# Patient Record
Sex: FEMALE | Race: WHITE | NOT HISPANIC OR LATINO | Employment: OTHER | URBAN - NONMETROPOLITAN AREA
[De-identification: names, ages, dates, MRNs, and addresses within clinical notes are randomized per-mention and may not be internally consistent; named-entity substitution may affect disease eponyms.]

---

## 2017-03-08 DIAGNOSIS — I47.10 SVT (SUPRAVENTRICULAR TACHYCARDIA): ICD-10-CM

## 2017-03-08 RX ORDER — FLECAINIDE ACETATE 50 MG/1
50 TABLET ORAL 2 TIMES DAILY
Qty: 180 TAB | Refills: 1 | Status: CANCELLED | OUTPATIENT
Start: 2017-03-08

## 2017-03-08 RX ORDER — FLECAINIDE ACETATE 50 MG/1
50 TABLET ORAL 2 TIMES DAILY
Qty: 180 TAB | Refills: 3 | Status: SHIPPED | OUTPATIENT
Start: 2017-03-08 | End: 2018-04-17 | Stop reason: SDUPTHER

## 2017-06-16 ENCOUNTER — OFFICE VISIT (OUTPATIENT)
Dept: CARDIOLOGY | Facility: CLINIC | Age: 72
End: 2017-06-16
Payer: MEDICARE

## 2017-06-16 VITALS
DIASTOLIC BLOOD PRESSURE: 80 MMHG | WEIGHT: 165 LBS | HEART RATE: 70 BPM | BODY MASS INDEX: 25.9 KG/M2 | SYSTOLIC BLOOD PRESSURE: 120 MMHG | HEIGHT: 67 IN

## 2017-06-16 DIAGNOSIS — I34.1 MITRAL VALVE PROLAPSE: ICD-10-CM

## 2017-06-16 DIAGNOSIS — I47.10 SVT (SUPRAVENTRICULAR TACHYCARDIA): ICD-10-CM

## 2017-06-16 DIAGNOSIS — E78.00 PURE HYPERCHOLESTEROLEMIA: ICD-10-CM

## 2017-06-16 DIAGNOSIS — I10 ESSENTIAL HYPERTENSION, BENIGN: ICD-10-CM

## 2017-06-16 PROCEDURE — 99213 OFFICE O/P EST LOW 20 MIN: CPT | Performed by: INTERNAL MEDICINE

## 2017-06-16 ASSESSMENT — ENCOUNTER SYMPTOMS
ORTHOPNEA: 0
COUGH: 0
MYALGIAS: 0
PND: 0
WHEEZING: 0

## 2017-06-16 NOTE — Clinical Note
[facility]   [address] [place] [address]  Phone: [phone]  Fax: [phone]              Saba Stallworth  1945    Encounter Date: 6/16/2017    Liborio Davis M.D.          PROGRESS NOTE:  Subjective:   Saba Stallworth is a 72 y.o. female who presents today for follow-up of her arrhythmia and her blood pressure.  She's had occasional palpitation and also transient positional vertigo which has not recurred.  She notes edema at the end of a long trip.      Past Medical History   Diagnosis Date   • Hypothyroidism    • Hyperlipidemia    • SVT (supraventricular tachycardia) 3/28/2014     Successfully ablated, 2002    • Arthritis      right thumb and wrist   • Hypertension    • Cold 2/7/15     sinus infection   • Staph infection 1/2015     right axilla Tx with antibiotics     Past Surgical History   Procedure Laterality Date   • Hysterectomy laparoscopy       Dr. Johnson   • Tonsillectomy     • Other cardiac surgery  2002     cardiac ablation   • Mass excision general  12/12/2014     Performed by Tara Hassan M.D. at SURGERY [facility]   • Recovery  2/19/2015     Performed by Cath-Recovery Surgery at SURGERY SAME DAY [facility]     Family History   Problem Relation Age of Onset   • Cancer Mother      leiomyosarcoma   • Heart Disease Father    • Heart Failure Father    • Heart Attack Father    • Heart Disease Maternal Uncle    • Heart Attack Maternal Uncle    • Heart Attack Paternal Grandmother    • Other Maternal Grandmother      scoliosis     History   Smoking status   • Never Smoker    Smokeless tobacco   • Never Used     No Known Allergies  Outpatient Encounter Prescriptions as of 6/16/2017   Medication Sig Dispense Refill   • flecainide (TAMBOCOR) 50 MG tablet Take 1 Tab by mouth 2 times a day. 180 Tab 3   • potassium chloride SA (K-DUR) 20 MEQ Tab CR Take 1 Tab by mouth every day. 90 Tab 1   • furosemide (LASIX) 20 MG  "Tab Take 20 mg by mouth every day.     • denosumab (PROLIA) 60 MG/ML SOLN Inject 60 mg as instructed Once. Every 6 months     • Multiple Vitamin (MULTI-VITAMIN DAILY PO) Take  by mouth every day.     • Cholecalciferol (VITAMIN D PO) Take  by mouth every day.     • levothyroxine (SYNTHROID) 75 MCG TABS Take 75 mcg by mouth every day.     • simvastatin (ZOCOR) 10 MG TABS Take 10 mg by mouth every evening.     • venlafaxine (EFFEXOR) 75 MG TABS Take 75 mg by mouth every day.       No facility-administered encounter medications on file as of 6/16/2017.     Review of Systems   Respiratory: Negative for cough and wheezing.    Cardiovascular: Negative for orthopnea and PND.   Musculoskeletal: Negative for myalgias.        Objective:   /80 mmHg  Pulse 70  Ht 1.702 m (5' 7\")  Wt 74.844 kg (165 lb)  BMI 25.84 kg/m2    Physical Exam   Constitutional: She is oriented to person, place, and time. She appears well-developed and well-nourished.   Eyes: Conjunctivae are normal. No scleral icterus.   Neck: No JVD present.   Cardiovascular: Normal rate, regular rhythm, normal heart sounds and intact distal pulses.  Exam reveals no gallop.    No murmur heard.  Pulmonary/Chest: Effort normal and breath sounds normal.   Musculoskeletal: She exhibits no edema.   Neurological: She is alert and oriented to person, place, and time.   Skin: Skin is warm and dry.   Psychiatric: She has a normal mood and affect. Thought content normal.       Assessment:     1. Essential hypertension, benign     2. SVT (supraventricular tachycardia)     3. Mitral valve prolapse     4. Pure hypercholesterolemia       Her lab are very satisfactory including good control of her lipids. We did review them because the automatic nomogram gives her a low GFR which of course she does not have based on her actual appearance and ideal body weight and creatinine. By Cockroft-Gault her GFR is 60.  Medical Decision Making:  Today's Assessment / Status / Plan:     "     Continue the current cardiovascular regimen.  Continue primary follow up with  Dr. Schofield.   Cardiology follow up in  6 months and  sooner if needed for any change.  EP eval if breakthough of arrhythmia.  Lab before next visit.  Use of the emergency medical system reviewed.       No Recipients

## 2017-06-16 NOTE — PROGRESS NOTES
Subjective:   Saba Stallworth is a 72 y.o. female who presents today for follow-up of her arrhythmia and her blood pressure.  She's had occasional palpitation and also transient positional vertigo which has not recurred.  She notes edema at the end of a long trip.      Past Medical History   Diagnosis Date   • Hypothyroidism    • Hyperlipidemia    • SVT (supraventricular tachycardia) 3/28/2014     Successfully ablated, 2002    • Arthritis      right thumb and wrist   • Hypertension    • Cold 2/7/15     sinus infection   • Staph infection 1/2015     right axilla Tx with antibiotics     Past Surgical History   Procedure Laterality Date   • Hysterectomy laparoscopy       Dr. Johnson   • Tonsillectomy     • Other cardiac surgery  2002     cardiac ablation   • Mass excision general  12/12/2014     Performed by Tara Hassan M.D. at SURGERY [facility]   • Recovery  2/19/2015     Performed by Cath-Recovery Surgery at SURGERY SAME DAY [facility]     Family History   Problem Relation Age of Onset   • Cancer Mother      leiomyosarcoma   • Heart Disease Father    • Heart Failure Father    • Heart Attack Father    • Heart Disease Maternal Uncle    • Heart Attack Maternal Uncle    • Heart Attack Paternal Grandmother    • Other Maternal Grandmother      scoliosis     History   Smoking status   • Never Smoker    Smokeless tobacco   • Never Used     No Known Allergies  Outpatient Encounter Prescriptions as of 6/16/2017   Medication Sig Dispense Refill   • flecainide (TAMBOCOR) 50 MG tablet Take 1 Tab by mouth 2 times a day. 180 Tab 3   • potassium chloride SA (K-DUR) 20 MEQ Tab CR Take 1 Tab by mouth every day. 90 Tab 1   • furosemide (LASIX) 20 MG Tab Take 20 mg by mouth every day.     • denosumab (PROLIA) 60 MG/ML SOLN Inject 60 mg as instructed Once. Every 6 months     • Multiple Vitamin (MULTI-VITAMIN DAILY PO) Take  by mouth every day.     • Cholecalciferol (VITAMIN D PO) Take  by mouth every day.     •  "levothyroxine (SYNTHROID) 75 MCG TABS Take 75 mcg by mouth every day.     • simvastatin (ZOCOR) 10 MG TABS Take 10 mg by mouth every evening.     • venlafaxine (EFFEXOR) 75 MG TABS Take 75 mg by mouth every day.       No facility-administered encounter medications on file as of 6/16/2017.     Review of Systems   Respiratory: Negative for cough and wheezing.    Cardiovascular: Negative for orthopnea and PND.   Musculoskeletal: Negative for myalgias.        Objective:   /80 mmHg  Pulse 70  Ht 1.702 m (5' 7\")  Wt 74.844 kg (165 lb)  BMI 25.84 kg/m2    Physical Exam   Constitutional: She is oriented to person, place, and time. She appears well-developed and well-nourished.   Eyes: Conjunctivae are normal. No scleral icterus.   Neck: No JVD present.   Cardiovascular: Normal rate, regular rhythm, normal heart sounds and intact distal pulses.  Exam reveals no gallop.    No murmur heard.  Pulmonary/Chest: Effort normal and breath sounds normal.   Musculoskeletal: She exhibits no edema.   Neurological: She is alert and oriented to person, place, and time.   Skin: Skin is warm and dry.   Psychiatric: She has a normal mood and affect. Thought content normal.       Assessment:     1. Essential hypertension, benign     2. SVT (supraventricular tachycardia)     3. Mitral valve prolapse     4. Pure hypercholesterolemia       Her lab are very satisfactory including good control of her lipids. We did review them because the automatic nomogram gives her a low GFR which of course she does not have based on her actual appearance and ideal body weight and creatinine. By Cockroft-Gault her GFR is 60.  Medical Decision Making:  Today's Assessment / Status / Plan:     Continue the current cardiovascular regimen.  Continue primary follow up with  Dr. Schofield.   Cardiology follow up in  6 months and  sooner if needed for any change.  EP eval if breakthough of arrhythmia.  Lab before next visit.  Use of the emergency medical " system reviewed.

## 2017-08-24 DIAGNOSIS — E87.6 HYPOKALEMIA: ICD-10-CM

## 2017-08-24 RX ORDER — POTASSIUM CHLORIDE 20 MEQ/1
20 TABLET, EXTENDED RELEASE ORAL DAILY
Qty: 90 TAB | Refills: 3 | Status: CANCELLED | OUTPATIENT
Start: 2017-08-24

## 2017-08-25 DIAGNOSIS — E87.6 HYPOKALEMIA: ICD-10-CM

## 2017-08-25 RX ORDER — POTASSIUM CHLORIDE 20 MEQ/1
20 TABLET, EXTENDED RELEASE ORAL DAILY
Qty: 90 TAB | Refills: 3 | Status: SHIPPED | OUTPATIENT
Start: 2017-08-25 | End: 2019-09-11 | Stop reason: SDUPTHER

## 2017-11-06 ENCOUNTER — OFFICE VISIT (OUTPATIENT)
Dept: CARDIOLOGY | Facility: MEDICAL CENTER | Age: 72
End: 2017-11-06
Payer: MEDICARE

## 2017-11-06 VITALS
HEART RATE: 72 BPM | HEIGHT: 67 IN | BODY MASS INDEX: 24.33 KG/M2 | OXYGEN SATURATION: 94 % | SYSTOLIC BLOOD PRESSURE: 140 MMHG | DIASTOLIC BLOOD PRESSURE: 70 MMHG | WEIGHT: 155 LBS

## 2017-11-06 DIAGNOSIS — R00.2 PALPITATIONS: ICD-10-CM

## 2017-11-06 DIAGNOSIS — R07.89 CHEST HEAVINESS: ICD-10-CM

## 2017-11-06 DIAGNOSIS — I47.10 SVT (SUPRAVENTRICULAR TACHYCARDIA): ICD-10-CM

## 2017-11-06 DIAGNOSIS — I49.3 VENTRICULAR PREMATURE COMPLEXES: ICD-10-CM

## 2017-11-06 DIAGNOSIS — I34.1 MITRAL VALVE PROLAPSE: ICD-10-CM

## 2017-11-06 DIAGNOSIS — E03.9 HYPOTHYROIDISM, ACQUIRED: ICD-10-CM

## 2017-11-06 PROCEDURE — 99213 OFFICE O/P EST LOW 20 MIN: CPT | Performed by: INTERNAL MEDICINE

## 2017-11-06 RX ORDER — LEVOTHYROXINE SODIUM 0.05 MG/1
50 TABLET ORAL
COMMUNITY
Start: 2017-10-06

## 2017-11-06 RX ORDER — METOPROLOL SUCCINATE 25 MG/1
25 TABLET, EXTENDED RELEASE ORAL DAILY
Qty: 30 TAB | Refills: 6 | Status: SHIPPED | OUTPATIENT
Start: 2017-11-06 | End: 2018-07-03 | Stop reason: SDUPTHER

## 2017-11-06 ASSESSMENT — ENCOUNTER SYMPTOMS
FALLS: 0
COUGH: 0
WHEEZING: 0
PND: 0
MYALGIAS: 0
ORTHOPNEA: 0

## 2017-11-06 NOTE — LETTER
[facility]   [address]   [address]  [place] [address]  Phone: [phone]  Fax: [phone]              Saba Stallworth  1945    Encounter Date: 11/6/2017    Liborio Davis M.D.          PROGRESS NOTE:  Subjective:   Saba Stallworth is a 72 y.o. female who presents today For follow-up of palpitation. She has not had sustained arrhythmia but she's noted more occasional isolated premature beats. She has had no syncope nor near syncope nor nothing to suggest angina nor congestive symptoms.    Past Medical History:   Diagnosis Date   • Arthritis     right thumb and wrist   • Cold 2/7/15    sinus infection   • Hyperlipidemia    • Hypertension    • Hypothyroidism    • Staph infection 1/2015    right axilla Tx with antibiotics   • SVT (supraventricular tachycardia) (CMS-[facility]) 03/28/2014    Successfully ablated, 2002, residual nonsustained atrial ectopy controlled with flecainide     Past Surgical History:   Procedure Laterality Date   • RECOVERY  2/19/2015    Performed by Cath-Recovery Surgery at SURGERY SAME DAY [facility]S   • MASS EXCISION GENERAL  12/12/2014    Performed by Tara Hassan M.D. at SURGERY [facility] ORS   • OTHER CARDIAC SURGERY  2002    cardiac ablation   • HYSTERECTOMY LAPAROSCOPY      Dr. Johnson   • TONSILLECTOMY       Family History   Problem Relation Age of Onset   • Cancer Mother      leiomyosarcoma   • Heart Disease Father    • Heart Failure Father    • Heart Attack Father    • Heart Attack Paternal Grandmother    • Other Maternal Grandmother      scoliosis   • Heart Disease Maternal Uncle    • Heart Attack Maternal Uncle      History   Smoking Status   • Never Smoker   Smokeless Tobacco   • Never Used     No Known Allergies  Outpatient Encounter Prescriptions as of 11/6/2017   Medication Sig Dispense Refill   • metoprolol SR (TOPROL XL) 25 MG TABLET SR 24 HR Take 1 Tab by mouth every day. 30 Tab  "6   • potassium chloride SA (KDUR) 20 MEQ Tab CR Take 1 Tab by mouth every day. 90 Tab 3   • flecainide (TAMBOCOR) 50 MG tablet Take 1 Tab by mouth 2 times a day. 180 Tab 3   • furosemide (LASIX) 20 MG Tab Take 20 mg by mouth every day.     • Multiple Vitamin (MULTI-VITAMIN DAILY PO) Take  by mouth every day.     • Cholecalciferol (VITAMIN D PO) Take  by mouth every day.     • simvastatin (ZOCOR) 10 MG TABS Take 10 mg by mouth every evening.     • venlafaxine (EFFEXOR) 75 MG TABS Take 75 mg by mouth every day.     • levothyroxine (SYNTHROID) 50 MCG Tab      • [DISCONTINUED] denosumab (PROLIA) 60 MG/ML SOLN Inject 60 mg as instructed Once. Every 6 months     • [DISCONTINUED] levothyroxine (SYNTHROID) 75 MCG TABS Take 75 mcg by mouth every day.       No facility-administered encounter medications on file as of 11/6/2017.      Review of Systems   Respiratory: Negative for cough and wheezing.    Cardiovascular: Negative for orthopnea and PND.   Musculoskeletal: Negative for falls and myalgias.        Objective:   /70   Pulse 72   Ht 1.702 m (5' 7\")   Wt 70.3 kg (155 lb)   SpO2 94%   BMI 24.28 kg/m²      Physical Exam   Constitutional: She is oriented to person, place, and time. She appears well-developed and well-nourished.   Eyes: Conjunctivae are normal. No scleral icterus.   Neck: No JVD present.   Cardiovascular: Normal rate, regular rhythm, normal heart sounds and intact distal pulses.  Exam reveals no gallop.    No murmur heard.  Pulmonary/Chest: Effort normal and breath sounds normal.   Musculoskeletal: She exhibits no edema.   Neurological: She is alert and oriented to person, place, and time.   Skin: Skin is warm and dry.   Psychiatric: She has a normal mood and affect. Thought content normal.       Assessment:     1. Palpitations  metoprolol SR (TOPROL XL) 25 MG TABLET SR 24 HR   2. Ventricular premature complexes     3. Mitral valve prolapse     4. SVT (supraventricular tachycardia) (CMS-HCC)       "     Arrhythmias do not occur often enough to catch on a standard Holter but she might benefit from Zio patch.  That is technically difficult now with her in [place].    Medical Decision Making:  Today's Assessment / Status / Plan:     Trial of metoprolol in addition to the flecainide.  It has been a couple of years since she last saw Dr. Bloom and I will set up her next visit with him in 2 months to access the results of this and review of continued flecainide therapy with immediate reevaluation if any other problems and continued primary follow-up with Dr. Schofield.      Debbie Schofield M.D.  [address] [place] [id number]  VIA Facsimile: [phone]

## 2017-11-06 NOTE — LETTER
[facility]   [address]   [address]  [place] [address]  Phone: [phone]  Fax: [phone]              Saba Stallworth  1945    Encounter Date: 11/6/2017    Liborio Davis M.D.          PROGRESS NOTE:  Subjective:   Saba Stallworth is a 72 y.o. female who presents today For follow-up of palpitation. She has not had sustained arrhythmia but she's noted more occasional isolated premature beats. She has had no syncope nor near syncope nor nothing to suggest angina nor congestive symptoms.    Past Medical History:   Diagnosis Date   • Arthritis     right thumb and wrist   • Cold 2/7/15    sinus infection   • Hyperlipidemia    • Hypertension    • Hypothyroidism    • Staph infection 1/2015    right axilla Tx with antibiotics   • SVT (supraventricular tachycardia) (CMS-[facility]) 03/28/2014    Successfully ablated, 2002, residual nonsustained atrial ectopy controlled with flecainide     Past Surgical History:   Procedure Laterality Date   • RECOVERY  2/19/2015    Performed by Cath-Recovery Surgery at SURGERY SAME DAY [facility] ORS   • MASS EXCISION GENERAL  12/12/2014    Performed by Tara Hassan M.D. at SURGERY [facility] ORS   • OTHER CARDIAC SURGERY  2002    cardiac ablation   • HYSTERECTOMY LAPAROSCOPY      Dr. Johnson   • TONSILLECTOMY       Family History   Problem Relation Age of Onset   • Cancer Mother      leiomyosarcoma   • Heart Disease Father    • Heart Failure Father    • Heart Attack Father    • Heart Attack Paternal Grandmother    • Other Maternal Grandmother      scoliosis   • Heart Disease Maternal Uncle    • Heart Attack Maternal Uncle      History   Smoking Status   • Never Smoker   Smokeless Tobacco   • Never Used     No Known Allergies  Outpatient Encounter Prescriptions as of 11/6/2017   Medication Sig Dispense Refill   • metoprolol SR (TOPROL XL) 25 MG TABLET SR 24 HR Take 1 Tab by mouth every day. 30 Tab  "6   • potassium chloride SA (KDUR) 20 MEQ Tab CR Take 1 Tab by mouth every day. 90 Tab 3   • flecainide (TAMBOCOR) 50 MG tablet Take 1 Tab by mouth 2 times a day. 180 Tab 3   • furosemide (LASIX) 20 MG Tab Take 20 mg by mouth every day.     • Multiple Vitamin (MULTI-VITAMIN DAILY PO) Take  by mouth every day.     • Cholecalciferol (VITAMIN D PO) Take  by mouth every day.     • simvastatin (ZOCOR) 10 MG TABS Take 10 mg by mouth every evening.     • venlafaxine (EFFEXOR) 75 MG TABS Take 75 mg by mouth every day.     • levothyroxine (SYNTHROID) 50 MCG Tab      • [DISCONTINUED] denosumab (PROLIA) 60 MG/ML SOLN Inject 60 mg as instructed Once. Every 6 months     • [DISCONTINUED] levothyroxine (SYNTHROID) 75 MCG TABS Take 75 mcg by mouth every day.       No facility-administered encounter medications on file as of 11/6/2017.      Review of Systems   Respiratory: Negative for cough and wheezing.    Cardiovascular: Negative for orthopnea and PND.   Musculoskeletal: Negative for falls and myalgias.        Objective:   /70   Pulse 72   Ht 1.702 m (5' 7\")   Wt 70.3 kg (155 lb)   SpO2 94%   BMI 24.28 kg/m²      Physical Exam   Constitutional: She is oriented to person, place, and time. She appears well-developed and well-nourished.   Eyes: Conjunctivae are normal. No scleral icterus.   Neck: No JVD present.   Cardiovascular: Normal rate, regular rhythm, normal heart sounds and intact distal pulses.  Exam reveals no gallop.    No murmur heard.  Pulmonary/Chest: Effort normal and breath sounds normal.   Musculoskeletal: She exhibits no edema.   Neurological: She is alert and oriented to person, place, and time.   Skin: Skin is warm and dry.   Psychiatric: She has a normal mood and affect. Thought content normal.       Assessment:     1. Palpitations  metoprolol SR (TOPROL XL) 25 MG TABLET SR 24 HR   2. Ventricular premature complexes     3. Mitral valve prolapse     4. SVT (supraventricular tachycardia) (CMS-HCC)       "     Arrhythmias do not occur often enough to catch on a standard Holter but she might benefit from Zio patch.  That is technically difficult now with her in [place].    Medical Decision Making:  Today's Assessment / Status / Plan:     Trial of metoprolol in addition to the flecainide.  It has been a couple of years since she last saw Dr. Bloom and I will set up her next visit with him in 2 months to access the results of this and review of continued flecainide therapy with immediate reevaluation if any other problems and continued primary follow-up with Dr. Schofield.      No Recipients

## 2017-11-07 NOTE — PROGRESS NOTES
Subjective:   Saba Stallworth is a 72 y.o. female who presents today For follow-up of palpitation. She has not had sustained arrhythmia but she's noted more occasional isolated premature beats. She has had no syncope nor near syncope nor nothing to suggest angina nor congestive symptoms.    Past Medical History:   Diagnosis Date   • Arthritis     right thumb and wrist   • Cold 2/7/15    sinus infection   • Hyperlipidemia    • Hypertension    • Hypothyroidism    • Staph infection 1/2015    right axilla Tx with antibiotics   • SVT (supraventricular tachycardia) (CMS-HCC) 03/28/2014    Successfully ablated, 2002, residual nonsustained atrial ectopy controlled with flecainide     Past Surgical History:   Procedure Laterality Date   • RECOVERY  2/19/2015    Performed by Cath-Recovery Surgery at SURGERY SAME DAY [facility] ORS   • MASS EXCISION GENERAL  12/12/2014    Performed by Tara Hassan M.D. at SURGERY [facility]S   • OTHER CARDIAC SURGERY  2002    cardiac ablation   • HYSTERECTOMY LAPAROSCOPY      Dr. Johnson   • TONSILLECTOMY       Family History   Problem Relation Age of Onset   • Cancer Mother      leiomyosarcoma   • Heart Disease Father    • Heart Failure Father    • Heart Attack Father    • Heart Attack Paternal Grandmother    • Other Maternal Grandmother      scoliosis   • Heart Disease Maternal Uncle    • Heart Attack Maternal Uncle      History   Smoking Status   • Never Smoker   Smokeless Tobacco   • Never Used     No Known Allergies  Outpatient Encounter Prescriptions as of 11/6/2017   Medication Sig Dispense Refill   • metoprolol SR (TOPROL XL) 25 MG TABLET SR 24 HR Take 1 Tab by mouth every day. 30 Tab 6   • potassium chloride SA (KDUR) 20 MEQ Tab CR Take 1 Tab by mouth every day. 90 Tab 3   • flecainide (TAMBOCOR) 50 MG tablet Take 1 Tab by mouth 2 times a day. 180 Tab 3   • furosemide (LASIX) 20 MG Tab Take 20 mg by mouth every day.     • Multiple Vitamin (MULTI-VITAMIN DAILY PO) Take  by  "mouth every day.     • Cholecalciferol (VITAMIN D PO) Take  by mouth every day.     • simvastatin (ZOCOR) 10 MG TABS Take 10 mg by mouth every evening.     • venlafaxine (EFFEXOR) 75 MG TABS Take 75 mg by mouth every day.     • levothyroxine (SYNTHROID) 50 MCG Tab      • [DISCONTINUED] denosumab (PROLIA) 60 MG/ML SOLN Inject 60 mg as instructed Once. Every 6 months     • [DISCONTINUED] levothyroxine (SYNTHROID) 75 MCG TABS Take 75 mcg by mouth every day.       No facility-administered encounter medications on file as of 11/6/2017.      Review of Systems   Respiratory: Negative for cough and wheezing.    Cardiovascular: Negative for orthopnea and PND.   Musculoskeletal: Negative for falls and myalgias.        Objective:   /70   Pulse 72   Ht 1.702 m (5' 7\")   Wt 70.3 kg (155 lb)   SpO2 94%   BMI 24.28 kg/m²     Physical Exam   Constitutional: She is oriented to person, place, and time. She appears well-developed and well-nourished.   Eyes: Conjunctivae are normal. No scleral icterus.   Neck: No JVD present.   Cardiovascular: Normal rate, regular rhythm, normal heart sounds and intact distal pulses.  Exam reveals no gallop.    No murmur heard.  Pulmonary/Chest: Effort normal and breath sounds normal.   Musculoskeletal: She exhibits no edema.   Neurological: She is alert and oriented to person, place, and time.   Skin: Skin is warm and dry.   Psychiatric: She has a normal mood and affect. Thought content normal.       Assessment:     1. Palpitations  metoprolol SR (TOPROL XL) 25 MG TABLET SR 24 HR   2. Ventricular premature complexes     3. Mitral valve prolapse     4. SVT (supraventricular tachycardia) (CMS-HCC)       Arrhythmias do not occur often enough to catch on a standard Holter but she might benefit from Zio patch.  That is technically difficult now with her in [place].    Medical Decision Making:  Today's Assessment / Status / Plan:     Trial of metoprolol in addition to the flecainide.  It has been " a couple of years since she last saw Dr. Bloom and I will set up her next visit with him in 2 months to access the results of this and review of continued flecainide therapy with immediate reevaluation if any other problems and continued primary follow-up with Dr. Schofield.

## 2018-01-12 ENCOUNTER — TELEPHONE (OUTPATIENT)
Dept: CARDIOLOGY | Facility: MEDICAL CENTER | Age: 73
End: 2018-01-12

## 2018-01-12 NOTE — TELEPHONE ENCOUNTER
No PAR required  Saba Stallworth Fabian: [id number]  Outcome  Additional Information Required  Available without authorization.  DrugFlecainide Acetate 50MG tablets  [facility] Form

## 2018-01-23 ENCOUNTER — OFFICE VISIT (OUTPATIENT)
Dept: CARDIOLOGY | Facility: MEDICAL CENTER | Age: 73
End: 2018-01-23
Payer: MEDICARE

## 2018-01-23 VITALS
HEART RATE: 78 BPM | SYSTOLIC BLOOD PRESSURE: 122 MMHG | BODY MASS INDEX: 25.58 KG/M2 | HEIGHT: 67 IN | WEIGHT: 163 LBS | DIASTOLIC BLOOD PRESSURE: 72 MMHG | OXYGEN SATURATION: 94 %

## 2018-01-23 VITALS
HEART RATE: 78 BPM | WEIGHT: 163 LBS | SYSTOLIC BLOOD PRESSURE: 122 MMHG | BODY MASS INDEX: 25.58 KG/M2 | OXYGEN SATURATION: 94 % | HEIGHT: 67 IN | DIASTOLIC BLOOD PRESSURE: 72 MMHG

## 2018-01-23 DIAGNOSIS — I47.10 SVT (SUPRAVENTRICULAR TACHYCARDIA): ICD-10-CM

## 2018-01-23 DIAGNOSIS — N18.30 STAGE 3 CHRONIC KIDNEY DISEASE (HCC): ICD-10-CM

## 2018-01-23 DIAGNOSIS — I10 ESSENTIAL HYPERTENSION, BENIGN: ICD-10-CM

## 2018-01-23 DIAGNOSIS — I50.32 CHRONIC DIASTOLIC CONGESTIVE HEART FAILURE (HCC): ICD-10-CM

## 2018-01-23 LAB — EKG IMPRESSION: NORMAL

## 2018-01-23 PROCEDURE — 99214 OFFICE O/P EST MOD 30 MIN: CPT | Performed by: INTERNAL MEDICINE

## 2018-01-23 PROCEDURE — 93000 ELECTROCARDIOGRAM COMPLETE: CPT | Performed by: INTERNAL MEDICINE

## 2018-01-23 ASSESSMENT — ENCOUNTER SYMPTOMS
NERVOUS/ANXIOUS: 1
PALPITATIONS: 1
PALPITATIONS: 1

## 2018-01-23 NOTE — PROGRESS NOTES
Subjective:   Saba Stallworth is a 72 y.o. female who presents today for follow up    Chief Complaint: more svt    She is doing well but she is having some episodes of high hr and bp    Things acting up ofver the last 3 months.  2-3 times a week.  She does not have any idea what brings it on.  Has 1 cup coffee a day.      With episodes she has a log of bp with hr.  She has elevated bp on all of them.  Today bp seems low for her.        Past Medical History:   Diagnosis Date   • Arthritis     right thumb and wrist   • Cold 2/7/15    sinus infection   • Hyperlipidemia    • Hypertension    • Hypothyroidism    • Staph infection 1/2015    right axilla Tx with antibiotics   • SVT (supraventricular tachycardia) (CMS-[facility]) 03/28/2014    Successfully ablated, 2002, residual nonsustained atrial ectopy controlled with flecainide     Past Surgical History:   Procedure Laterality Date   • RECOVERY  2/19/2015    Performed by Cath-Recovery Surgery at SURGERY SAME DAY [facility] ORS   • MASS EXCISION GENERAL  12/12/2014    Performed by Tara Hassan M.D. at SURGERY [facility] ORS   • OTHER CARDIAC SURGERY  2002    cardiac ablation   • HYSTERECTOMY LAPAROSCOPY      Dr. Johnson   • TONSILLECTOMY       Family History   Problem Relation Age of Onset   • Cancer Mother      leiomyosarcoma   • Heart Disease Father    • Heart Failure Father    • Heart Attack Father    • Heart Attack Paternal Grandmother    • Other Maternal Grandmother      scoliosis   • Heart Disease Maternal Uncle    • Heart Attack Maternal Uncle      History   Smoking Status   • Never Smoker   Smokeless Tobacco   • Never Used     No Known Allergies  Outpatient Encounter Prescriptions as of 1/23/2018   Medication Sig Dispense Refill   • levothyroxine (SYNTHROID) 50 MCG Tab      • metoprolol SR (TOPROL XL) 25 MG TABLET SR 24 HR Take 1 Tab by mouth every day. 30 Tab 6   • potassium chloride SA (KDUR) 20 MEQ Tab CR Take 1 Tab by mouth every day. 90 Tab 3   • flecainide  "(TAMBOCOR) 50 MG tablet Take 1 Tab by mouth 2 times a day. 180 Tab 3   • furosemide (LASIX) 20 MG Tab Take 20 mg by mouth every day.     • Multiple Vitamin (MULTI-VITAMIN DAILY PO) Take  by mouth every day.     • Cholecalciferol (VITAMIN D PO) Take  by mouth every day.     • simvastatin (ZOCOR) 10 MG TABS Take 10 mg by mouth every evening.     • venlafaxine (EFFEXOR) 75 MG TABS Take 75 mg by mouth every day.       No facility-administered encounter medications on file as of 1/23/2018.      Review of Systems   Cardiovascular: Positive for palpitations.        Objective:   /72   Pulse 78   Ht 1.702 m (5' 7.01\")   Wt 73.9 kg (163 lb)   SpO2 94%   BMI 25.52 kg/m²     Physical Exam   Constitutional: She is oriented to person, place, and time. She appears well-developed and well-nourished. No distress.   HENT:   Head: Normocephalic and atraumatic.   Right Ear: External ear normal.   Left Ear: External ear normal.   Mouth/Throat: Oropharynx is clear and moist.   Eyes: Conjunctivae and EOM are normal. Right eye exhibits no discharge. Left eye exhibits no discharge. No scleral icterus.   Neck: Normal range of motion. Neck supple. No JVD present. No tracheal deviation present. No thyromegaly present.   Cardiovascular: Normal rate, regular rhythm, normal heart sounds and intact distal pulses.  Exam reveals no gallop and no friction rub.    No murmur heard.  Pulmonary/Chest: Effort normal and breath sounds normal. No stridor. No respiratory distress. She has no wheezes. She has no rales.   Abdominal: Soft. She exhibits no distension.   Musculoskeletal: She exhibits no edema or tenderness.   Neurological: She is alert and oriented to person, place, and time. No cranial nerve deficit. Coordination normal.   Skin: Skin is warm and dry. No rash noted. She is not diaphoretic. No erythema. No pallor.   Psychiatric: She has a normal mood and affect. Her behavior is normal. Judgment and thought content normal.   Vitals " reviewed.      Assessment:     1. SVT (supraventricular tachycardia) (CMS-[facility])  EKG       Medical Decision Making:  Today's Assessment / Status / Plan:   Refine bp log - daily bp  Record dates and duration of svt  For now, will try to move pm dose earlier and gently increase dose of flec if needed.  If intolerant will go with sotalol

## 2018-01-23 NOTE — PROGRESS NOTES
Cardiology Follow-up Consultation Note    Date of note:    1/23/2018    Primary Care Provider: Debbie Schofield M.D.  Referring Provider: Liborio Davis M.D.    Patient Name: Saba Stallworth   YOB: 1945  MRN:              [id number]    Chief Complaint: palpitations    History of Present Illness: Saba Stallworth is a 72 y.o. female whose current medical problems include hypertension, hyperlipidemia, Heart failure with preserved ejection fraction, CKD stage III, and SVT s/p ablation who is here for follow-up    She was last seen by Dr. Davis on 11/2017.  She saw Dr. Bloom earlier today.     Interim Events:  She continues to have symptomatic palpitations, almost daily.  BP in the 150s-160s/60s-70s. HR ranged from .      In terms of her CHF, her weight is stable on lasix 20mg PO daily, no leg swelling/orthopnea.    In terms of her hypertension, her BP is elevated at home when she has palpitations, but otherwise normal.    In terms of her CKD, no change in urination, she has not had labs in over 6 months.     Review of Systems   HENT: Positive for hearing loss.    Cardiovascular: Positive for leg swelling and palpitations.   Psychiatric/Behavioral: The patient is nervous/anxious.        All other systems reviewed and discussed using a comprehensive questionnaire and are negative.       Past Medical History:   Diagnosis Date   • Arthritis     right thumb and wrist   • Cold 2/7/15    sinus infection   • Hyperlipidemia    • Hypertension    • Hypothyroidism    • Staph infection 1/2015    right axilla Tx with antibiotics   • SVT (supraventricular tachycardia) (CMS-[facility]) 03/28/2014    Ablation by Dr. Griffin in 2002, second ablation by Dr. Bloom in 2014. residual nonsustained atrial ectopy controlled with flecainide         Past Surgical History:   Procedure Laterality Date   • RECOVERY  2/19/2015    Performed by Cath-Recovery Surgery at SURGERY [facility]   • MASS  EXCISION GENERAL  12/12/2014    Performed by Tara Hassan M.D. at SURGERY [facility]   • OTHER CARDIAC SURGERY  2002    cardiac ablation   • HYSTERECTOMY LAPAROSCOPY      Dr. Johnson   • TONSILLECTOMY           Current Outpatient Prescriptions   Medication Sig Dispense Refill   • levothyroxine (SYNTHROID) 50 MCG Tab      • flecainide (TAMBOCOR) 50 MG tablet Take 1 Tab by mouth 2 times a day. 180 Tab 3   • furosemide (LASIX) 20 MG Tab Take 20 mg by mouth every day.     • metoprolol SR (TOPROL XL) 25 MG TABLET SR 24 HR Take 1 Tab by mouth every day. 30 Tab 6   • potassium chloride SA (KDUR) 20 MEQ Tab CR Take 1 Tab by mouth every day. 90 Tab 3   • Multiple Vitamin (MULTI-VITAMIN DAILY PO) Take  by mouth every day.     • Cholecalciferol (VITAMIN D PO) Take  by mouth every day.     • simvastatin (ZOCOR) 10 MG TABS Take 10 mg by mouth every evening.     • venlafaxine (EFFEXOR) 75 MG TABS Take 75 mg by mouth every day.       No current facility-administered medications for this visit.          No Known Allergies      Family History   Problem Relation Age of Onset   • Cancer Mother      leiomyosarcoma   • Heart Disease Father    • Heart Failure Father    • Heart Attack Father    • Heart Attack Paternal Grandmother    • Other Maternal Grandmother      scoliosis   • Heart Disease Maternal Uncle    • Heart Attack Maternal Uncle          Social History     Social History   • Marital status:      Spouse name: N/A   • Number of children: N/A   • Years of education: N/A     Occupational History   • Not on file.     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          Physical Exam:  Ambulatory  "Vitals  Blood pressure 122/72, pulse 78, height 1.702 m (5' 7.01\"), weight 73.9 kg (163 lb), SpO2 94 %.   Oxygen Therapy:  Pulse Oximetry: 94 %  BP Readings from Last 4 Encounters:   01/23/18 122/72   01/23/18 122/72   11/06/17 140/70   06/16/17 120/80       Weight/BMI: Body mass index is 25.52 kg/m².  Wt Readings from Last 4 Encounters:   01/23/18 73.9 kg (163 lb)   01/23/18 73.9 kg (163 lb)   11/06/17 70.3 kg (155 lb)   06/16/17 74.8 kg (165 lb)       General: Well appearing and in no apparent distress  Eyes: nl conjunctiva  ENT: OP clear  Neck: JVP 4 cm H2O, no carotid bruits  Lungs: normal respiratory effort, CTAB  Heart: RRR, no murmurs, no rubs or gallops, no edema bilateral lower extremities. No LV/RV heave on cardiac palpatation. 2+ bilateral radial pulses.  2+ bilateral dp pulses.   Abdomen: soft, non tender, non distended, no masses, normal bowel sounds.  No HSM.  Extremities/MSK: no clubbing, no cyanosis  Neurological: No focal sensory deficits  Psychiatric: Appropriate affect, A/O x 3  Skin: Warm extremities      Lab Data Review:    OSH labs 6/2017:  Creatinine 1.1, eGFR 48  Potassium 4.1  Sodium 144  tg 75    HDL 64          Cardiac Imaging and Procedures Review:    EKG dated 1/23/2018:  SINUS RHYTHM  FIRST DEGREE AV BLOCK  LEFT AXIS DEVIATION  BORDERLINE R WAVE PROGRESSION, ANTERIOR LEADS      Holter Monitor (10/2016):   Interpretive Statements   *  Monitoring started at 11:45 AM and continued for 24 hours. Cardiac rhythm   is Sinus with First Degree AV Block. Heart   rate in the range of 54 to 119 BPM. Average heart rate was 74 BPM. The   longest R-R interval was 1.5 seconds.   *  Ventricular ectopic activity consisted of one isolated interpolated PVCs.   *  The patient's rhythm included one hour 55 seconds of sinus bradycardia.   The slowest single episode occurred at   5:28:44 AM, lasting six minutes 20 seconds, with minimum heart rate of 54   BPM.   *  The patient's rhythm also " included one hour 48 seconds of sinus   tachycardia. The fastest single episode occurred at   10:02:58 AM, lasting 44 seconds, with maximum heart rate of 119 BPM.   *  Supraventricular ectopic activity consisted of two atrial couplets, 22   single PACs.   *  Diary entries - all symptoms reported in diary were without the time of   occurrence. Unable to document symptoms with times.     IMPRESSION: Rare PACs and PVCs.     Stress test 2013:  6 minutes 25 seconds  Max   Negative.         Medical Decision Makin. SVT (supraventricular tachycardia) (CMS-HCC)  -Cont flecainde/metoprolol\  -Last stress test negative in , no angina  -no echo seen in our system, patient reports last was years ago, will check to see if significant LVH, WMA, and for diastolic function  -reviewed ekg today    2. Essential hypertension, benign  Well controlled    3. Stage 3 chronic kidney disease  -BMP to see if stable    4. Chronic diastolic congestive heart failure (CMS-HCC)  NYHA class I.   -Cont lasix 20mg PO Daily/potassium  -BMP/BNP  -echo.     No Follow-up on file.      Baldemar Bueno MD  [facility]  [facility], [address]  [address] [address]  Phone: [phone]  Fax: [phone]

## 2018-01-26 DIAGNOSIS — I10 ESSENTIAL HYPERTENSION, BENIGN: ICD-10-CM

## 2018-01-26 DIAGNOSIS — I50.32 CHRONIC DIASTOLIC CONGESTIVE HEART FAILURE (HCC): ICD-10-CM

## 2018-01-26 DIAGNOSIS — N18.30 STAGE 3 CHRONIC KIDNEY DISEASE: ICD-10-CM

## 2018-01-31 ENCOUNTER — TELEPHONE (OUTPATIENT)
Dept: CARDIOLOGY | Facility: MEDICAL CENTER | Age: 73
End: 2018-01-31

## 2018-02-01 NOTE — TELEPHONE ENCOUNTER
----- Message from Baldemar Bueno M.D. sent at 1/31/2018  4:39 PM PST -----  The patients renal function labs and electrolytes are completely stable, please call her and let them know.  If they ask about other pending results, please let them know we will contact them when they are available. Thank you!  -Dr. Bueno    ===================================================================    Called pt, discussed lab result per Dr Bueno, pt verbalizes understanding

## 2018-03-05 ENCOUNTER — TELEPHONE (OUTPATIENT)
Dept: CARDIOLOGY | Facility: MEDICAL CENTER | Age: 73
End: 2018-03-05

## 2018-03-06 NOTE — TELEPHONE ENCOUNTER
No PAR needed:  Saba Stallworth Fabian: [id number]  Outcome  Additional Information Required  Available without authorization.  DrugFlecainide Acetate 50MG tablets  [facility]    Pharmacy informed to contact plan's help desk to process claim

## 2018-04-17 DIAGNOSIS — I47.10 SVT (SUPRAVENTRICULAR TACHYCARDIA): ICD-10-CM

## 2018-04-19 RX ORDER — FLECAINIDE ACETATE 50 MG/1
50 TABLET ORAL 2 TIMES DAILY
Qty: 180 TAB | Refills: 3 | Status: SHIPPED | OUTPATIENT
Start: 2018-04-19 | End: 2019-07-18 | Stop reason: SDUPTHER

## 2018-07-03 ENCOUNTER — OFFICE VISIT (OUTPATIENT)
Dept: CARDIOLOGY | Facility: MEDICAL CENTER | Age: 73
End: 2018-07-03
Payer: MEDICARE

## 2018-07-03 VITALS
OXYGEN SATURATION: 94 % | SYSTOLIC BLOOD PRESSURE: 132 MMHG | HEART RATE: 90 BPM | DIASTOLIC BLOOD PRESSURE: 72 MMHG | HEIGHT: 67 IN | WEIGHT: 165 LBS | BODY MASS INDEX: 25.9 KG/M2

## 2018-07-03 DIAGNOSIS — I47.10 SVT (SUPRAVENTRICULAR TACHYCARDIA): ICD-10-CM

## 2018-07-03 DIAGNOSIS — E78.00 PURE HYPERCHOLESTEROLEMIA: ICD-10-CM

## 2018-07-03 DIAGNOSIS — I10 ESSENTIAL HYPERTENSION, BENIGN: ICD-10-CM

## 2018-07-03 DIAGNOSIS — R00.2 PALPITATIONS: ICD-10-CM

## 2018-07-03 DIAGNOSIS — E03.9 HYPOTHYROIDISM, ACQUIRED: ICD-10-CM

## 2018-07-03 PROCEDURE — 99214 OFFICE O/P EST MOD 30 MIN: CPT | Performed by: NURSE PRACTITIONER

## 2018-07-03 RX ORDER — FUROSEMIDE 20 MG/1
20 TABLET ORAL DAILY
Qty: 90 TAB | Refills: 3 | Status: SHIPPED | OUTPATIENT
Start: 2018-07-03 | End: 2019-07-18 | Stop reason: SDUPTHER

## 2018-07-03 RX ORDER — METOPROLOL SUCCINATE 25 MG/1
25 TABLET, EXTENDED RELEASE ORAL DAILY
Qty: 90 TAB | Refills: 3 | Status: SHIPPED | OUTPATIENT
Start: 2018-07-03 | End: 2018-12-11

## 2018-07-03 ASSESSMENT — ENCOUNTER SYMPTOMS
MYALGIAS: 0
COUGH: 0
PALPITATIONS: 0
CHILLS: 0
FEVER: 0
ORTHOPNEA: 0
BRUISES/BLEEDS EASILY: 0
PND: 0
ABDOMINAL PAIN: 0
NAUSEA: 0
SHORTNESS OF BREATH: 0
DIZZINESS: 0
HEADACHES: 0
LOSS OF CONSCIOUSNESS: 0

## 2018-07-03 NOTE — PROGRESS NOTES
Chief Complaint   Patient presents with   • Follow-Up   • Supraventricular Tachycardia (SVT)   • Palpitations   • HTN (Controlled)   • Hyperlipidemia       Subjective:   Saba Stallworth is a 73 y.o. female who presents today for six month follow-up of SVT, hypertension and hyperlipidemia.    Saba is a 73 year old female with history of SVT, first ablated in 2002, with repeat attempt in February 2015 (unable to induce SVT/AT/AF), maintained on Flecainide and Toprol, hypertension and hyperlipidemia, normally followed by Dr. Bueno.    Since the last visit, she has been doing well: she feels good, with no SVT episodes. Occasional, rare, nonsustained skipped beats, but nothing significant. No chest pain, pressure or discomfort; no shortness of breath, orthopnea or PND; no dizziness or syncope; no edema. 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    Past Medical History:   Diagnosis Date   • Arthritis     right thumb and wrist   • Cold 2/7/15    sinus infection   • Hyperlipidemia    • Hypertension    • Hypothyroidism    • Palpitations    • Staph infection 1/2015    right axilla Tx with antibiotics   • SVT (supraventricular tachycardia) (HCC) 2002/2015 2002:Ablation by Dr. Griffin. February 2015: EPS, unable to induce SVT or AT/AF. Maintained on medical therapy.     Past Surgical History:   Procedure Laterality Date   • RECOVERY  2/19/2015    Performed by Cath-Recovery Surgery at SURGERY SAME DAY [facility] ORS   • MASS EXCISION GENERAL  12/12/2014    Performed by Tara Hassan M.D. at SURGERY [facility] ORS   • OTHER CARDIAC SURGERY  2002    cardiac ablation   • HYSTERECTOMY LAPAROSCOPY      Dr. Johnson   • TONSILLECTOMY       Family History   Problem Relation Age of Onset   • Cancer Mother      leiomyosarcoma   • Heart Disease Father    • Heart Failure Father    • Heart Attack Father    • Heart Attack Paternal Grandmother    • Other  Maternal Grandmother      scoliosis   • Heart Disease Maternal Uncle    • Heart Attack Maternal Uncle      Social History     Social History   • Marital status:      Spouse name: N/A   • Number of children: N/A   • Years of education: N/A     Occupational History   • Not on file.     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      No Known Allergies  Outpatient Encounter Prescriptions as of 7/3/2018   Medication Sig Dispense Refill   • metoprolol SR (TOPROL XL) 25 MG TABLET SR 24 HR Take 1 Tab by mouth every day. 90 Tab 3   • furosemide (LASIX) 20 MG Tab Take 1 Tab by mouth every day. 90 Tab 3   • flecainide (TAMBOCOR) 50 MG tablet Take 1 Tab by mouth 2 times a day. 180 Tab 3   • levothyroxine (SYNTHROID) 50 MCG Tab      • potassium chloride SA (KDUR) 20 MEQ Tab CR Take 1 Tab by mouth every day. 90 Tab 3   • Multiple Vitamin (MULTI-VITAMIN DAILY PO) Take  by mouth every day.     • simvastatin (ZOCOR) 10 MG TABS Take 10 mg by mouth every evening.     • venlafaxine (EFFEXOR) 75 MG TABS Take 75 mg by mouth every day.     • [DISCONTINUED] metoprolol SR (TOPROL XL) 25 MG TABLET SR 24 HR Take 1 Tab by mouth every day. 30 Tab 6   • [DISCONTINUED] furosemide (LASIX) 20 MG Tab Take 20 mg by mouth every day.     • [DISCONTINUED] Cholecalciferol (VITAMIN D PO) Take  by mouth every day.       No facility-administered encounter medications on file as of 7/3/2018.      Review of Systems   Constitutional: Negative for chills and fever.   HENT: Negative for congestion.    Respiratory: Negative for cough and shortness of breath.    Cardiovascular: Negative for chest pain, palpitations, orthopnea, leg  "swelling and PND.   Gastrointestinal: Negative for abdominal pain and nausea.   Musculoskeletal: Negative for myalgias.   Skin: Negative for rash.   Neurological: Negative for dizziness, loss of consciousness and headaches.   Endo/Heme/Allergies: Does not bruise/bleed easily.        Objective:   /72   Pulse 90   Ht 1.702 m (5' 7\")   Wt 74.8 kg (165 lb)   SpO2 94%   BMI 25.84 kg/m²     Physical Exam   Constitutional: She is oriented to person, place, and time. She appears well-developed and well-nourished.   HENT:   Head: Normocephalic.   Eyes: EOM are normal.   Neck: Normal range of motion. Neck supple. No JVD present.   Cardiovascular: Normal rate, regular rhythm and normal heart sounds.    Pulmonary/Chest: Effort normal and breath sounds normal. No respiratory distress. She has no wheezes. She has no rales.   Abdominal: Soft. Bowel sounds are normal. She exhibits no distension. There is no tenderness.   Musculoskeletal: Normal range of motion. She exhibits no edema.   Neurological: She is alert and oriented to person, place, and time.   Skin: Skin is warm and dry. No rash noted.   Psychiatric: She has a normal mood and affect.      RESULTS OF ECHOCARDIOGRAM OF 1/30/2018 - REVIEWED WITH PATIENT:  Normal LV size  LVEF 60%  Normal RA, LA and RV.  Mild MR  Trace AI  Trace TR  Trace PI  Normal aortic root    No recent labwork done.    Assessment:     1. SVT (supraventricular tachycardia) (HCC)  TSH    metoprolol SR (TOPROL XL) 25 MG TABLET SR 24 HR    furosemide (LASIX) 20 MG Tab   2. Palpitations  metoprolol SR (TOPROL XL) 25 MG TABLET SR 24 HR   3. Essential hypertension, benign  CBC WITH DIFFERENTIAL    metoprolol SR (TOPROL XL) 25 MG TABLET SR 24 HR    furosemide (LASIX) 20 MG Tab   4. Pure hypercholesterolemia  COMP METABOLIC PANEL    LIPID PROFILE   5. Hypothyroidism, acquired  TSH       Medical Decision Making:  Today's Assessment / Status / Plan:     1. SVT, controlled with Flecainide and Toprol, " stable. She feels good.    2. Hypertension, treated and stable.    3. Hyperlipidemia, treated. To check fasting CMP and lipid panel.    4. Hypothyroidism, treated. To check TSH.    Same medications, which are renewed today. Labs as above. Keep December 2018 FU with Dr. Bueno. FU sooner if clinical condition changes.    Collaborating MD: Manuela

## 2018-07-03 NOTE — LETTER
[facility]   [address]   [address]   [place] [address]  Phone: [phone]  Fax: [phone]              Saba Stallworth  1945    Encounter Date: 7/3/2018    GIOVANNY Arnold          PROGRESS NOTE:  Chief Complaint   Patient presents with   • Follow-Up   • Supraventricular Tachycardia (SVT)   • Palpitations   • HTN (Controlled)   • Hyperlipidemia       Subjective:   Saba Stallworth is a 73 y.o. female who presents today for six month follow-up of SVT, hypertension and hyperlipidemia.    Saba is a 73 year old female with history of SVT, first ablated in 2002, with repeat attempt in February 2015 (unable to induce SVT/AT/AF), maintained on Flecainide and Toprol, hypertension and hyperlipidemia, normally followed by Dr. Bueno.    Since the last visit, she has been doing well: she feels good, with no SVT episodes. Occasional, rare, nonsustained skipped beats, but nothing significant. No chest pain, pressure or discomfort; no shortness of breath, orthopnea or PND; no dizziness or syncope; no edema. 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    Past Medical History:   Diagnosis Date   • Arthritis     right thumb and wrist   • Cold 2/7/15    sinus infection   • Hyperlipidemia    • Hypertension    • Hypothyroidism    • Palpitations    • Staph infection 1/2015    right axilla Tx with antibiotics   • SVT (supraventricular tachycardia) (HCC) 2002/2015 2002:Ablation by Dr. Griffin. February 2015: EPS, unable to induce SVT or AT/AF. Maintained on medical therapy.     Past Surgical History:   Procedure Laterality Date   • RECOVERY  2/19/2015    Performed by Cath-Recovery Surgery at SURGERY SAME DAY [facility] ORS   • MASS EXCISION GENERAL  12/12/2014    Performed by Tara Hassan M.D. at SURGERY [facility] ORS   • OTHER CARDIAC SURGERY  2002    cardiac  ablation   • HYSTERECTOMY LAPAROSCOPY      Dr. Johnson   • TONSILLECTOMY       Family History   Problem Relation Age of Onset   • Cancer Mother      leiomyosarcoma   • Heart Disease Father    • Heart Failure Father    • Heart Attack Father    • Heart Attack Paternal Grandmother    • Other Maternal Grandmother      scoliosis   • Heart Disease Maternal Uncle    • Heart Attack Maternal Uncle      Social History     Social History   • Marital status:      Spouse name: N/A   • Number of children: N/A   • Years of education: N/A     Occupational History   • Not on file.     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      No Known Allergies  Outpatient Encounter Prescriptions as of 7/3/2018   Medication Sig Dispense Refill   • metoprolol SR (TOPROL XL) 25 MG TABLET SR 24 HR Take 1 Tab by mouth every day. 90 Tab 3   • furosemide (LASIX) 20 MG Tab Take 1 Tab by mouth every day. 90 Tab 3   • flecainide (TAMBOCOR) 50 MG tablet Take 1 Tab by mouth 2 times a day. 180 Tab 3   • levothyroxine (SYNTHROID) 50 MCG Tab      • potassium chloride SA (KDUR) 20 MEQ Tab CR Take 1 Tab by mouth every day. 90 Tab 3   • Multiple Vitamin (MULTI-VITAMIN DAILY PO) Take  by mouth every day.     • simvastatin (ZOCOR) 10 MG TABS Take 10 mg by mouth every evening.     • venlafaxine (EFFEXOR) 75 MG TABS Take 75 mg by mouth every day.     • [DISCONTINUED] metoprolol SR (TOPROL XL) 25 MG TABLET SR 24 HR Take 1 Tab by mouth every day. 30 Tab 6   • [DISCONTINUED] furosemide (LASIX) 20 MG Tab Take 20 mg by mouth every day.     • [DISCONTINUED] Cholecalciferol (VITAMIN D PO) Take  by mouth every day.       No facility-administered  "encounter medications on file as of 7/3/2018.      Review of Systems   Constitutional: Negative for chills and fever.   HENT: Negative for congestion.    Respiratory: Negative for cough and shortness of breath.    Cardiovascular: Negative for chest pain, palpitations, orthopnea, leg swelling and PND.   Gastrointestinal: Negative for abdominal pain and nausea.   Musculoskeletal: Negative for myalgias.   Skin: Negative for rash.   Neurological: Negative for dizziness, loss of consciousness and headaches.   Endo/Heme/Allergies: Does not bruise/bleed easily.        Objective:   /72   Pulse 90   Ht 1.702 m (5' 7\")   Wt 74.8 kg (165 lb)   SpO2 94%   BMI 25.84 kg/m²      Physical Exam   Constitutional: She is oriented to person, place, and time. She appears well-developed and well-nourished.   HENT:   Head: Normocephalic.   Eyes: EOM are normal.   Neck: Normal range of motion. Neck supple. No JVD present.   Cardiovascular: Normal rate, regular rhythm and normal heart sounds.    Pulmonary/Chest: Effort normal and breath sounds normal. No respiratory distress. She has no wheezes. She has no rales.   Abdominal: Soft. Bowel sounds are normal. She exhibits no distension. There is no tenderness.   Musculoskeletal: Normal range of motion. She exhibits no edema.   Neurological: She is alert and oriented to person, place, and time.   Skin: Skin is warm and dry. No rash noted.   Psychiatric: She has a normal mood and affect.      RESULTS OF ECHOCARDIOGRAM OF 1/30/2018 - REVIEWED WITH PATIENT:  Normal LV size  LVEF 60%  Normal RA, LA and RV.  Mild MR  Trace AI  Trace TR  Trace PI  Normal aortic root    No recent labwork done.    Assessment:     1. SVT (supraventricular tachycardia) (HCC)  TSH    metoprolol SR (TOPROL XL) 25 MG TABLET SR 24 HR    furosemide (LASIX) 20 MG Tab   2. Palpitations  metoprolol SR (TOPROL XL) 25 MG TABLET SR 24 HR   3. Essential hypertension, benign  CBC WITH DIFFERENTIAL    metoprolol SR (TOPROL " XL) 25 MG TABLET SR 24 HR    furosemide (LASIX) 20 MG Tab   4. Pure hypercholesterolemia  COMP METABOLIC PANEL    LIPID PROFILE   5. Hypothyroidism, acquired  TSH       Medical Decision Making:  Today's Assessment / Status / Plan:     1. SVT, controlled with Flecainide and Toprol, stable. She feels good.    2. Hypertension, treated and stable.    3. Hyperlipidemia, treated. To check fasting CMP and lipid panel.    4. Hypothyroidism, treated. To check TSH.    Same medications, which are renewed today. Labs as above. Keep December 2018 FU with Dr. Bueno. FU sooner if clinical condition changes.    Collaborating MD: Manuela      No Recipients

## 2018-12-11 ENCOUNTER — OFFICE VISIT (OUTPATIENT)
Dept: CARDIOLOGY | Facility: MEDICAL CENTER | Age: 73
End: 2018-12-11
Payer: MEDICARE

## 2018-12-11 VITALS
WEIGHT: 167 LBS | BODY MASS INDEX: 26.21 KG/M2 | OXYGEN SATURATION: 94 % | HEART RATE: 84 BPM | SYSTOLIC BLOOD PRESSURE: 122 MMHG | HEIGHT: 67 IN | DIASTOLIC BLOOD PRESSURE: 82 MMHG

## 2018-12-11 DIAGNOSIS — R00.2 PALPITATIONS: ICD-10-CM

## 2018-12-11 DIAGNOSIS — I47.10 PAROXYSMAL SUPRAVENTRICULAR TACHYCARDIA: ICD-10-CM

## 2018-12-11 DIAGNOSIS — I47.10 SVT (SUPRAVENTRICULAR TACHYCARDIA): ICD-10-CM

## 2018-12-11 DIAGNOSIS — E78.00 PURE HYPERCHOLESTEROLEMIA: ICD-10-CM

## 2018-12-11 DIAGNOSIS — I10 ESSENTIAL HYPERTENSION, BENIGN: ICD-10-CM

## 2018-12-11 DIAGNOSIS — N18.30 STAGE 3 CHRONIC KIDNEY DISEASE (HCC): ICD-10-CM

## 2018-12-11 LAB — EKG IMPRESSION: NORMAL

## 2018-12-11 PROCEDURE — 93000 ELECTROCARDIOGRAM COMPLETE: CPT | Performed by: INTERNAL MEDICINE

## 2018-12-11 PROCEDURE — 99214 OFFICE O/P EST MOD 30 MIN: CPT | Performed by: INTERNAL MEDICINE

## 2018-12-11 RX ORDER — METOPROLOL SUCCINATE 50 MG/1
50 TABLET, EXTENDED RELEASE ORAL DAILY
Qty: 90 TAB | Refills: 3 | Status: SHIPPED | OUTPATIENT
Start: 2018-12-11 | End: 2020-01-22 | Stop reason: SDUPTHER

## 2018-12-11 NOTE — PROGRESS NOTES
Cardiology Follow-up Consultation Note    Date of note:    12/11/2018  Primary Care Provider: Debbie Schofield M.D.  Referring Provider: Liborio Davis M.D.    Patient Name: Saba Stallworth   YOB: 1945  MRN:              [id number]    Chief Complaint: palpitations    History of Present Illness: Saba Stallworth is a 73 y.o. female whose current medical problems include hypertension, hyperlipidemia, Heart failure with preserved ejection fraction, CKD stage III, and SVT s/p ablation who is here for follow-up    At our visit, 1/23/2018:  She continues to have symptomatic palpitations, almost daily.  BP in the 150s-160s/60s-70s. HR ranged from .      In terms of her CHF, her weight is stable on lasix 20mg PO daily, no leg swelling/orthopnea.    In terms of her hypertension, her BP is elevated at home when she has palpitations, but otherwise normal.    In terms of her CKD, no change in urination, she has not had labs in over 6 months.       Interim Events:  In terms of her SVT, she does have moderate severity palpitations which occur around once a week and lasts about 5 minutes and then resolves spontaneously. Not associated with activity. Does not usually miss medications.     Weight stable on lasix, no ROSE. Going to water aerobics class.     In terms of hypertension, much better controlled.       Review of Systems   Constitution: Negative for chills, fever + night sweats.   HENT: Negative for nosebleeds.    Eyes: Negative for vision loss in left eye and vision loss in right eye.   Respiratory: Negative for hemoptysis.    Gastrointestinal: Negative for hematemesis, hematochezia and melena.   Genitourinary: Negative for hematuria.   Neurological: Negative for focal weakness, numbness and paresthesias.         Past Medical History:   Diagnosis Date   • Arthritis     right thumb and wrist   • Cold 2/7/15    sinus infection   • Hyperlipidemia    • Hypertension    • Hypothyroidism     • Palpitations    • Staph infection 1/2015    right axilla Tx with antibiotics   • SVT (supraventricular tachycardia) (HCC) 2002/2015 2002:Ablation by Dr. Griffin. February 2015: EPS, unable to induce SVT or AT/AF. Maintained on medical therapy.         Past Surgical History:   Procedure Laterality Date   • RECOVERY  2/19/2015    Performed by Cath-Recovery Surgery at SURGERY SAME DAY [facility] ORS   • MASS EXCISION GENERAL  12/12/2014    Performed by Tara Hassan M.D. at SURGERY [facility] ORS   • OTHER CARDIAC SURGERY  2002    cardiac ablation   • HYSTERECTOMY LAPAROSCOPY      Dr. Johnson   • TONSILLECTOMY           Current Outpatient Prescriptions   Medication Sig Dispense Refill   • metoprolol SR (TOPROL XL) 25 MG TABLET SR 24 HR Take 1 Tab by mouth every day. 90 Tab 3   • furosemide (LASIX) 20 MG Tab Take 1 Tab by mouth every day. 90 Tab 3   • flecainide (TAMBOCOR) 50 MG tablet Take 1 Tab by mouth 2 times a day. 180 Tab 3   • levothyroxine (SYNTHROID) 50 MCG Tab      • potassium chloride SA (KDUR) 20 MEQ Tab CR Take 1 Tab by mouth every day. 90 Tab 3   • Multiple Vitamin (MULTI-VITAMIN DAILY PO) Take  by mouth every day.     • simvastatin (ZOCOR) 10 MG TABS Take 10 mg by mouth every evening.     • venlafaxine (EFFEXOR) 75 MG TABS Take 75 mg by mouth every day.       No current facility-administered medications for this visit.          No Known Allergies      Family History   Problem Relation Age of Onset   • Cancer Mother         leiomyosarcoma   • Heart Disease Father    • Heart Failure Father    • Heart Attack Father    • Heart Attack Paternal Grandmother    • Other Maternal Grandmother         scoliosis   • Heart Disease Maternal Uncle    • Heart Attack Maternal Uncle          Social History     Social History   • Marital status:      Spouse name: N/A   • Number of children: N/A   • Years of education: N/A     Occupational History   • Not on file.     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          Physical Exam:  Ambulatory Vitals  Blood pressure 122/82, pulse 84, height 1.702 m (5' 7\"), weight 75.8 kg (167 lb), SpO2 94 %.   Oxygen Therapy:  Pulse Oximetry: 94 %  BP Readings from Last 4 Encounters:   12/11/18 122/82   07/03/18 132/72   01/23/18 122/72   01/23/18 122/72       Weight/BMI: Body mass index is 26.16 kg/m².  Wt Readings from Last 4 Encounters:   12/11/18 75.8 kg (167 lb)   07/03/18 74.8 kg (165 lb)   01/23/18 73.9 kg (163 lb)   01/23/18 73.9 kg (163 lb)       General: Well appearing and in no apparent distress  Eyes: nl conjunctiva  ENT: OP clear  Neck: JVP 4 cm H2O, no carotid bruits  Lungs: normal respiratory effort, CTAB  Heart: RRR, no murmurs, no rubs or gallops, no edema bilateral lower extremities. No LV/RV heave on cardiac palpatation. 2+ bilateral radial pulses.  2+ bilateral dp pulses.   Abdomen: soft, non tender, non distended, no masses, normal bowel sounds.  No HSM.  Extremities/MSK: no clubbing, no cyanosis  Neurological: No focal sensory deficits  Psychiatric: Appropriate affect, A/O x 3  Skin: Warm extremities      Lab Data Review:    OSH labs 6/2017:  Creatinine 1.1, eGFR 48  Potassium 4.1  Sodium 144  tg 75    HDL 64      OSH labs 1/26/2018:    Creatinine 1.27  eGFR 42  Sodium 140  Potassium 4.3  Bun 16  Mag 2    OSH labs 12/5/2018:  CBC WNL    HDL 60  tg 124    TSH 3.6  Creatinine 1.26  Bun 13  LFTs WNL  Sodium 143  Potassium 4.2  hgbA1c 5.5          Cardiac Imaging and Procedures Review:    EKG dated 1/23/2018:  SINUS RHYTHM  FIRST DEGREE AV BLOCK  LEFT AXIS DEVIATION  BORDERLINE R WAVE PROGRESSION, ANTERIOR " LEADS    EKG dated 2018: my personal interpretation NSR, LVH, non-specific st changes.       Echocardiogram 2018          Holter Monitor (10/2016):   Interpretive Statements   *  Monitoring started at 11:45 AM and continued for 24 hours. Cardiac rhythm   is Sinus with First Degree AV Block. Heart   rate in the range of 54 to 119 BPM. Average heart rate was 74 BPM. The   longest R-R interval was 1.5 seconds.   *  Ventricular ectopic activity consisted of one isolated interpolated PVCs.   *  The patient's rhythm included one hour 55 seconds of sinus bradycardia.   The slowest single episode occurred at   5:28:44 AM, lasting six minutes 20 seconds, with minimum heart rate of 54   BPM.   *  The patient's rhythm also included one hour 48 seconds of sinus   tachycardia. The fastest single episode occurred at   10:02:58 AM, lasting 44 seconds, with maximum heart rate of 119 BPM.   *  Supraventricular ectopic activity consisted of two atrial couplets, 22   single PACs.   *  Diary entries - all symptoms reported in diary were without the time of   occurrence. Unable to document symptoms with times.     IMPRESSION: Rare PACs and PVCs.     Stress test 2013:  6 minutes 25 seconds  Max   Negative.         Medical Decision Makin. SVT (supraventricular tachycardia) (CMS-HCC)  -Cont flecainde/metoprolol  -Last stress test negative in , no angina  -EKG ordered and reviewed today is stable.     2. Essential hypertension, benign  Well controlled    3. Stage 3 chronic kidney disease  Stable, labs ordered by PCP.     4. Chronic diastolic congestive heart failure (CMS-HCC)  NYHA class I.   -Cont lasix 20mg PO Daily/potassium      Return in about 1 year (around 2019).      Baldemar Bueno MD  [facility]  [place] [facility], [address]  [address] [address]  Phone: [phone]  Fax: [phone]

## 2019-07-18 DIAGNOSIS — I10 ESSENTIAL HYPERTENSION, BENIGN: ICD-10-CM

## 2019-07-18 DIAGNOSIS — I47.10 SVT (SUPRAVENTRICULAR TACHYCARDIA): ICD-10-CM

## 2019-07-18 DIAGNOSIS — I50.32 CHRONIC DIASTOLIC (CONGESTIVE) HEART FAILURE (HCC): ICD-10-CM

## 2019-07-18 RX ORDER — FLECAINIDE ACETATE 50 MG/1
50 TABLET ORAL 2 TIMES DAILY
Qty: 180 TAB | Refills: 2 | Status: SHIPPED | OUTPATIENT
Start: 2019-07-18 | End: 2020-02-12

## 2019-07-18 RX ORDER — FUROSEMIDE 20 MG/1
20 TABLET ORAL DAILY
Qty: 90 TAB | Refills: 2 | Status: SHIPPED | OUTPATIENT
Start: 2019-07-18 | End: 2020-02-12

## 2019-09-11 DIAGNOSIS — E87.6 HYPOKALEMIA: ICD-10-CM

## 2019-09-11 RX ORDER — POTASSIUM CHLORIDE 20 MEQ/1
20 TABLET, EXTENDED RELEASE ORAL DAILY
Qty: 90 TAB | Refills: 3 | Status: SHIPPED | OUTPATIENT
Start: 2019-09-11 | End: 2020-02-12

## 2020-01-22 DIAGNOSIS — I47.10 SVT (SUPRAVENTRICULAR TACHYCARDIA) (HCC): ICD-10-CM

## 2020-01-22 DIAGNOSIS — I10 ESSENTIAL HYPERTENSION, BENIGN: ICD-10-CM

## 2020-01-22 DIAGNOSIS — R00.2 PALPITATIONS: ICD-10-CM

## 2020-01-23 RX ORDER — METOPROLOL SUCCINATE 50 MG/1
50 TABLET, EXTENDED RELEASE ORAL DAILY
Qty: 90 TAB | Refills: 3 | Status: SHIPPED
Start: 2020-01-23 | End: 2020-05-13

## 2020-01-29 ENCOUNTER — TELEPHONE (OUTPATIENT)
Dept: CARDIOLOGY | Facility: MEDICAL CENTER | Age: 75
End: 2020-01-29

## 2020-01-30 NOTE — TELEPHONE ENCOUNTER
Spoke w/ pt. Regarding upcoming appt. W/ [facility]O on 2/12. Pt. Has not had any recent labs done. Appt. Time and date confirmed.

## 2020-02-12 ENCOUNTER — OFFICE VISIT (OUTPATIENT)
Dept: CARDIOLOGY | Facility: MEDICAL CENTER | Age: 75
End: 2020-02-12
Payer: MEDICARE

## 2020-02-12 VITALS
HEART RATE: 92 BPM | DIASTOLIC BLOOD PRESSURE: 82 MMHG | HEIGHT: 67 IN | WEIGHT: 166 LBS | BODY MASS INDEX: 26.06 KG/M2 | OXYGEN SATURATION: 94 % | SYSTOLIC BLOOD PRESSURE: 124 MMHG

## 2020-02-12 DIAGNOSIS — I47.10 SVT (SUPRAVENTRICULAR TACHYCARDIA) (HCC): ICD-10-CM

## 2020-02-12 DIAGNOSIS — E87.6 HYPOKALEMIA: ICD-10-CM

## 2020-02-12 DIAGNOSIS — Z79.899 ENCOUNTER FOR MONITORING FLECAINIDE THERAPY: ICD-10-CM

## 2020-02-12 DIAGNOSIS — I10 ESSENTIAL HYPERTENSION, BENIGN: ICD-10-CM

## 2020-02-12 DIAGNOSIS — I50.32 CHRONIC DIASTOLIC (CONGESTIVE) HEART FAILURE (HCC): ICD-10-CM

## 2020-02-12 DIAGNOSIS — N18.30 STAGE 3 CHRONIC KIDNEY DISEASE: ICD-10-CM

## 2020-02-12 DIAGNOSIS — I34.1 MITRAL VALVE PROLAPSE: ICD-10-CM

## 2020-02-12 DIAGNOSIS — Z51.81 ENCOUNTER FOR MONITORING FLECAINIDE THERAPY: ICD-10-CM

## 2020-02-12 DIAGNOSIS — R00.2 PALPITATIONS: ICD-10-CM

## 2020-02-12 DIAGNOSIS — E78.00 PURE HYPERCHOLESTEROLEMIA: ICD-10-CM

## 2020-02-12 DIAGNOSIS — I49.3 VENTRICULAR PREMATURE COMPLEXES: ICD-10-CM

## 2020-02-12 LAB — EKG IMPRESSION: NORMAL

## 2020-02-12 PROCEDURE — 99214 OFFICE O/P EST MOD 30 MIN: CPT | Performed by: INTERNAL MEDICINE

## 2020-02-12 PROCEDURE — 93000 ELECTROCARDIOGRAM COMPLETE: CPT | Performed by: INTERNAL MEDICINE

## 2020-02-12 RX ORDER — SIMVASTATIN 10 MG
10 TABLET ORAL EVERY EVENING
Qty: 90 TAB | Refills: 3 | Status: SHIPPED | OUTPATIENT
Start: 2020-02-12 | End: 2020-02-19 | Stop reason: SDUPTHER

## 2020-02-12 RX ORDER — SIMVASTATIN 20 MG
TABLET ORAL
COMMUNITY
Start: 2019-12-21 | End: 2020-02-12

## 2020-02-12 RX ORDER — FUROSEMIDE 20 MG/1
20 TABLET ORAL
Status: SHIPPED | DISCHARGE
Start: 2020-02-12 | End: 2021-03-04

## 2020-02-12 RX ORDER — POTASSIUM CHLORIDE 20 MEQ/1
20 TABLET, EXTENDED RELEASE ORAL
COMMUNITY
Start: 2020-02-12 | End: 2021-03-04

## 2020-02-12 RX ORDER — FLECAINIDE ACETATE 50 MG/1
50 TABLET ORAL 2 TIMES DAILY
Qty: 180 TAB | Refills: 3 | Status: SHIPPED | OUTPATIENT
Start: 2020-02-12 | End: 2021-03-04

## 2020-02-12 ASSESSMENT — ENCOUNTER SYMPTOMS: MUSCLE CRAMPS: 1

## 2020-02-12 NOTE — PROGRESS NOTES
Cardiology Follow-up Consultation Note    Date of note:    2/12/2020  Primary Care Provider: Debbie Schofield M.D.  Referring Provider: Liborio Davis M.D.    Patient Name: Saba Stallworth   YOB: 1945  MRN:              [id number]    Chief Complaint: palpitations    History of Present Illness: Saba Stallworth is a 74 y.o. female whose current medical problems include hypertension, hyperlipidemia, Heart failure with preserved ejection fraction, CKD stage III, and SVT s/p ablation who is here for follow-up.    At our visit, 1/23/2018:  She continues to have symptomatic palpitations, almost daily.  BP in the 150s-160s/60s-70s. HR ranged from .      In terms of her CHF, her weight is stable on lasix 20mg PO daily, no leg swelling/orthopnea.    In terms of her hypertension, her BP is elevated at home when she has palpitations, but otherwise normal.    In terms of her CKD, no change in urination, she has not had labs in over 6 months.     At our visit, 12/11/2018:  In terms of her SVT, she does have moderate severity palpitations which occur around once a week and lasts about 5 minutes and then resolves spontaneously. Not associated with activity. Does not usually miss medications.     Weight stable on lasix, no ROES. Going to water aerobics class.     In terms of hypertension, much better controlled.     Interim Events:  In terms of SVT, no palpitations.     In terms of CHF, no longer taking lasix. Stable weight and no LE swelling.     Hypertension well controlled.       Review of Systems   HENT: Positive for hearing loss.    Musculoskeletal: Positive for muscle cramps (legs and feet at night.).      Constitution: Negative for chills, fever and night sweats.   HENT: Negative for nosebleeds.    Eyes: Negative for vision loss in left eye and vision loss in right eye.   Respiratory: Negative for hemoptysis.    Gastrointestinal: Negative for hematemesis, hematochezia and melena.    Genitourinary: Negative for hematuria.   Neurological: Negative for focal weakness, numbness and paresthesias.     All other systems reviewed and discussed using a comprehensive questionnaire and are negative.       Past Medical History:   Diagnosis Date   • Arthritis     right thumb and wrist   • Cold 2/7/15    sinus infection   • Hyperlipidemia    • Hypertension    • Hypothyroidism    • Palpitations    • Staph infection 1/2015    right axilla Tx with antibiotics   • SVT (supraventricular tachycardia) (HCC) 2002/2015 2002:Ablation by Dr. Griffin. February 2015: EPS, unable to induce SVT or AT/AF. Maintained on medical therapy.         Past Surgical History:   Procedure Laterality Date   • RECOVERY  2/19/2015    Performed by Cath-Recovery Surgery at SURGERY SAME DAY [facility] ORS   • MASS EXCISION GENERAL  12/12/2014    Performed by Tara Hassan M.D. at SURGERY [facility] ORS   • OTHER CARDIAC SURGERY  2002    cardiac ablation   • HYSTERECTOMY LAPAROSCOPY      Dr. Johnson   • TONSILLECTOMY           Current Outpatient Medications   Medication Sig Dispense Refill   • metoprolol SR (TOPROL XL) 50 MG TABLET SR 24 HR Take 1 Tab by mouth every day. 90 Tab 3   • potassium chloride SA (KDUR) 20 MEQ Tab CR Take 1 Tab by mouth every day. 90 Tab 3   • flecainide (TAMBOCOR) 50 MG tablet Take 1 Tab by mouth 2 times a day. 180 Tab 2   • levothyroxine (SYNTHROID) 50 MCG Tab      • Multiple Vitamin (MULTI-VITAMIN DAILY PO) Take  by mouth every day.     • simvastatin (ZOCOR) 10 MG TABS Take 10 mg by mouth every evening.     • venlafaxine (EFFEXOR) 75 MG TABS Take 75 mg by mouth every day.     • furosemide (LASIX) 20 MG Tab Take 1 Tab by mouth every day. (Patient not taking: Reported on 2/12/2020) 90 Tab 2     No current facility-administered medications for this visit.          No Known Allergies      Family History   Problem Relation Age of Onset   • Cancer Mother         leiomyosarcoma   • Heart Disease Father    •  "Heart Failure Father    • Heart Attack Father    • Heart Attack Paternal Grandmother    • Other Maternal Grandmother         scoliosis   • Heart Disease Maternal Uncle    • Heart Attack Maternal Uncle          Social History     Socioeconomic History   • Marital status:      Spouse name: Not on file   • Number of children: Not on file   • Years of education: Not on file   • Highest education level: Not on file   Occupational History   • Not on file   Social Needs   • Financial resource strain: Not on file   • Food insecurity     Worry: Not on file     Inability: Not on file   • Transportation needs     Medical: Not on file     Non-medical: Not on file   Tobacco Use   • Smoking status: Never Smoker   • Smokeless tobacco: Never Used   Substance and Sexual Activity   • Alcohol use: Yes     Comment: 0-3 per week   • Drug use: No   • Sexual activity: Not on file   Lifestyle   • Physical activity     Days per week: Not on file     Minutes per session: Not on file   • Stress: Not on file   Relationships   • Social connections     Talks on phone: Not on file     Gets together: Not on file     Attends Hoahaoism service: Not on file     Active member of club or organization: Not on file     Attends meetings of clubs or organizations: Not on file     Relationship status: Not on file   • Intimate partner violence     Fear of current or ex partner: Not on file     Emotionally abused: Not on file     Physically abused: Not on file     Forced sexual activity: Not on file   Other Topics Concern   • Not on file   Social History Narrative    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          Physical Exam:  Ambulatory Vitals  /82 (BP Location: Left arm, Patient Position: Sitting, BP Cuff Size: Adult)   Pulse 92   Ht 1.702 m (5' 7\")   Wt 75.3 kg (166 lb)   SpO2 94%    Oxygen Therapy:  Pulse Oximetry: 94 %  BP Readings from Last 4 Encounters:   02/12/20 " 124/82   12/11/18 122/82   07/03/18 132/72   01/23/18 122/72       Weight/BMI: Body mass index is 26 kg/m².  Wt Readings from Last 4 Encounters:   02/12/20 75.3 kg (166 lb)   12/11/18 75.8 kg (167 lb)   07/03/18 74.8 kg (165 lb)   01/23/18 73.9 kg (163 lb)       General: Well appearing and in no apparent distress  Eyes: nl conjunctiva  ENT: OP clear  Neck: JVP < 8 cm H2O, no carotid bruits  Lungs: normal respiratory effort, CTAB  Heart: RRR, no murmurs, no rubs or gallops, no edema bilateral lower extremities. No LV/RV heave on cardiac palpatation. 2+ bilateral radial pulses.  2+ bilateral dp pulses.   Abdomen: soft, non tender, non distended, no masses, normal bowel sounds.  No HSM.  Extremities/MSK: no clubbing, no cyanosis  Neurological: No focal sensory deficits  Psychiatric: Appropriate affect, A/O x 3  Skin: Warm extremities    Exam repeated in full and unchanged except for as noted above.        Lab Data Review:    OSH labs 6/2017:  Creatinine 1.1, eGFR 48  Potassium 4.1  Sodium 144  tg 75    HDL 64      OSH labs 1/26/2018:    Creatinine 1.27  eGFR 42  Sodium 140  Potassium 4.3  Bun 16  Mag 2    OSH labs 12/5/2018:  CBC WNL    HDL 60  tg 124    TSH 3.6  Creatinine 1.26  Bun 13  LFTs WNL  Sodium 143  Potassium 4.2  hgbA1c 5.5          Cardiac Imaging and Procedures Review:    EKG dated 1/23/2018:  SINUS RHYTHM  FIRST DEGREE AV BLOCK  LEFT AXIS DEVIATION  BORDERLINE R WAVE PROGRESSION, ANTERIOR LEADS    EKG dated 12/11/2018: my personal interpretation NSR, LVH, non-specific st changes.     EKG dated 2/12/2020:  Personal interpretation includes NSR, normal EKG.     Echocardiogram 1/30/2018          Holter Monitor (10/2016):   Interpretive Statements   *  Monitoring started at 11:45 AM and continued for 24 hours. Cardiac rhythm   is Sinus with First Degree AV Block. Heart   rate in the range of 54 to 119 BPM. Average heart rate was 74 BPM. The   longest R-R interval was 1.5  seconds.   *  Ventricular ectopic activity consisted of one isolated interpolated PVCs.   *  The patient's rhythm included one hour 55 seconds of sinus bradycardia.   The slowest single episode occurred at   5:28:44 AM, lasting six minutes 20 seconds, with minimum heart rate of 54   BPM.   *  The patient's rhythm also included one hour 48 seconds of sinus   tachycardia. The fastest single episode occurred at   10:02:58 AM, lasting 44 seconds, with maximum heart rate of 119 BPM.   *  Supraventricular ectopic activity consisted of two atrial couplets, 22   single PACs.   *  Diary entries - all symptoms reported in diary were without the time of   occurrence. Unable to document symptoms with times.     IMPRESSION: Rare PACs and PVCs.     Stress test 2013:  6 minutes 25 seconds  Max   Negative.         Medical Decision Makin. SVT (supraventricular tachycardia) (CMS-HCC)  -Cont flecainde/metoprolol  -Last stress test negative in , no angina  -EKG ordered and reviewed today is stable.     2. Essential hypertension, benign  Well controlled    3. Stage 3 chronic kidney disease  Stable, labs ordered by PCP. Will request OSH labs.     4. Chronic diastolic congestive heart failure (CMS-HCC)  NYHA class I.  -Cont lasix 20mg PO Daily prn. Switch to prn potassium.     5. Mitral valve prolapse - mild MR.   -repeat echo 2021.       Return in about 1 year (around 2021).      Baldemar Bueno MD  [facility]  [facility], [address]  [address] [address]  Phone: [phone]  Fax: [phone]

## 2020-02-14 ENCOUNTER — TELEPHONE (OUTPATIENT)
Dept: CARDIOLOGY | Facility: MEDICAL CENTER | Age: 75
End: 2020-02-14

## 2020-02-14 DIAGNOSIS — E78.00 PURE HYPERCHOLESTEROLEMIA: ICD-10-CM

## 2020-02-14 NOTE — TELEPHONE ENCOUNTER
JM    Pt states she was suppose to get 20mg of simvastatin but the order shows 10mg. Office note shows 20mg was removed, pls call pt to clarify the dosage she should be taking. #[phone]

## 2020-02-18 NOTE — TELEPHONE ENCOUNTER
Contacted patient. Discussed Simvastatin dose.  Patient has been on 20 mg for sometime and at last OV, 10 mg was ordered.     Upon chart review, no clear noting of statin dose.       To JM - OK to order simvastatin at 20 mg?

## 2020-02-19 RX ORDER — SIMVASTATIN 20 MG
20 TABLET ORAL EVERY EVENING
Qty: 90 TAB | Refills: 3 | Status: SHIPPED | OUTPATIENT
Start: 2020-02-19 | End: 2021-03-04

## 2020-02-20 NOTE — TELEPHONE ENCOUNTER
Discussed w/ Dr Bueno, per Dr Bueno, cont Simvastatin 20mg PO daily.     Called pt and notified, pt verbalizes understanding

## 2020-03-18 ENCOUNTER — TELEPHONE (OUTPATIENT)
Dept: CARDIOLOGY | Facility: MEDICAL CENTER | Age: 75
End: 2020-03-18

## 2020-03-18 NOTE — TELEPHONE ENCOUNTER
"Called pt, pt reports she has not been taking her Flecainide and Metoprolol for 2 weeks and now \"with all the health scare\" she decided that she would restart it back, she would like to know if it is safe for her to re-start it back together or should she just start on one medication for now. Advice pt she could safely re-start both medications together, discussed medication compliance importance with pt, pt verbalizes understanding.     To Dr Bueno, any further recommendations? Thanks!  "

## 2020-03-19 NOTE — TELEPHONE ENCOUNTER
Would take metoprolol XL 25mg (one half pill) today along with flecainde and then start full metoprolol 50mg PO daily tomorrow. Thanks!

## 2020-05-01 ENCOUNTER — TELEPHONE (OUTPATIENT)
Dept: CARDIOLOGY | Facility: MEDICAL CENTER | Age: 75
End: 2020-05-01

## 2020-05-01 DIAGNOSIS — I10 ESSENTIAL HYPERTENSION, BENIGN: ICD-10-CM

## 2020-05-01 DIAGNOSIS — I50.32 CHRONIC DIASTOLIC (CONGESTIVE) HEART FAILURE (HCC): ICD-10-CM

## 2020-05-01 DIAGNOSIS — I47.10 SVT (SUPRAVENTRICULAR TACHYCARDIA) (HCC): ICD-10-CM

## 2020-05-01 NOTE — TELEPHONE ENCOUNTER
GARO    Pt called to discuss her elevated BP, [phone].    This morning: 160/95   Last night: 187/78

## 2020-05-01 NOTE — TELEPHONE ENCOUNTER
"Spoke with patient who reports elevated BP for the past several days.  States that she \"got lazy\" with her meds and stopped taking them for about a month but is now back on her medications.    Reports no symptoms except for \"not feeling well\".    Advised patient that information would be routed to Dr. Bueno for recommendations.  "

## 2020-05-05 RX ORDER — AMLODIPINE BESYLATE 5 MG/1
5 TABLET ORAL DAILY
Qty: 90 TAB | Refills: 3 | Status: SHIPPED | OUTPATIENT
Start: 2020-05-05 | End: 2021-03-04

## 2020-05-05 NOTE — TELEPHONE ENCOUNTER
Spoke to patient who stated that her PCP wants to see her in-clinic to assess her blood pressure, so she'll be in [place] tomorrow.

## 2020-05-05 NOTE — TELEPHONE ENCOUNTER
"Spoke with Dr. Bueno.  Dr. Bueno instructed to call the patient and ask what medications she's stopped and restarted.    Called patient and asked what medications she stopped and restarted.  She states the only \"heart\" medications she has are Flecainide and Metoprolol.    Asked patient if she has a nephrologist who follows her.  She stated that she's been told that she has kidney disease but \"nothing has ever been done about it\".  Educated patient on the importance of overall health care and the responsibility to follow-up on these diagnoses.  Advised patient that her kidney disease also has an effect on her blood pressure.    Advised patient that updated labs are needed.  Patient requested for orders to be faxed to [facility] in [place].  Patient is concerned because her father passed away from heart disease at 75.  Encouraged patient to reach out to PCP in order to take control of her health again.    Spoke with Dr. Bueno who was in the office temporarily off post-call.  He recommends starting Norvasc 5mg daily and requested BMP, BNP, and Magnesium labs to be completed.  Medication and labs ordered.  Patient updated with this information.    Orders faxed to [facility] at [phone].  Completed fax confirmation received.  "

## 2020-05-12 ENCOUNTER — TELEPHONE (OUTPATIENT)
Dept: CARDIOLOGY | Facility: MEDICAL CENTER | Age: 75
End: 2020-05-12

## 2020-05-12 DIAGNOSIS — I10 ESSENTIAL HYPERTENSION, BENIGN: ICD-10-CM

## 2020-05-12 NOTE — TELEPHONE ENCOUNTER
GARO    Pt calling to discuss her lab results (in media). Pt is anxious to get a call about this b/c of her elevated BP & I advised pt she'll be getting a call when results are reviewed & if there is a delay, it is b/c GARO is rounding all week in the hospital. #[phone]

## 2020-05-12 NOTE — TELEPHONE ENCOUNTER
Spoke with patient.  She is extremely anxious about her cardiac health.  She shared her story again about how her father passed away at age 75 from heart failure and she is coming up on her 75rd birthday.    Patient states she is concerned about her BNP of 233 (labs under media dated 5/8/20). Hx of heart failure with preserved EF. States that she is not experiencing swelling, a wet cough, and has SOB occasionally.  Has PRN Furosemide and reports that she hasn't taken it for awhile.    Patient was started on amlodipine 5mg last week, and she reports that she has remained compliant since restarting her other medications.  Blood pressure has come down to the 140s-150s/70s so far.    Patient is requesting intervention for her BNP as she's concerned that it's high.    Encounter routed to Dr. Bueno for recommendations.  Advised patient that reply may be delayed due to hospital rounds.

## 2020-05-13 RX ORDER — CARVEDILOL 6.25 MG/1
6.25 TABLET ORAL 2 TIMES DAILY WITH MEALS
Qty: 180 TAB | Refills: 3 | Status: SHIPPED | OUTPATIENT
Start: 2020-05-13 | End: 2021-03-04

## 2020-05-13 NOTE — TELEPHONE ENCOUNTER
Spoke with Dr. Bueno.  Dr. Bueno instructed me to give reassurance to the patient about her BNP and that it is most likely her baseline and stemming from her CKD.    In regards to her blood pressure, Dr. Bueno recommends discontinuing Metoprolol and start Coreg 6.25mg BID.  Advised patient to continue to monitor blood pressure and that the goal is to remain below 140 systolic.      Called patient to update with new information. Patient verbalized understanding. Rx sent to pharmacy.  Medication list updated.

## 2021-02-15 ENCOUNTER — HOSPITAL ENCOUNTER (OUTPATIENT)
Dept: CARDIOLOGY | Facility: MEDICAL CENTER | Age: 76
End: 2021-02-15
Attending: INTERNAL MEDICINE
Payer: MEDICARE

## 2021-02-15 DIAGNOSIS — I34.1 MITRAL VALVE PROLAPSE: ICD-10-CM

## 2021-02-15 PROCEDURE — 93306 TTE W/DOPPLER COMPLETE: CPT

## 2021-02-17 LAB
LV EJECT FRACT  99904: 70
LV EJECT FRACT MOD 2C 99903: 73.48
LV EJECT FRACT MOD 4C 99902: 71.34
LV EJECT FRACT MOD BP 99901: 73.42

## 2021-02-17 PROCEDURE — 93306 TTE W/DOPPLER COMPLETE: CPT | Mod: 26 | Performed by: INTERNAL MEDICINE

## 2021-02-22 ENCOUNTER — HOSPITAL ENCOUNTER (OUTPATIENT)
Dept: RADIOLOGY | Facility: MEDICAL CENTER | Age: 76
End: 2021-02-22
Payer: MEDICARE

## 2021-03-04 ENCOUNTER — OFFICE VISIT (OUTPATIENT)
Dept: CARDIOLOGY | Facility: MEDICAL CENTER | Age: 76
End: 2021-03-04
Payer: MEDICARE

## 2021-03-04 VITALS
BODY MASS INDEX: 26.21 KG/M2 | DIASTOLIC BLOOD PRESSURE: 60 MMHG | RESPIRATION RATE: 14 BRPM | SYSTOLIC BLOOD PRESSURE: 132 MMHG | HEART RATE: 72 BPM | HEIGHT: 67 IN | WEIGHT: 167 LBS | OXYGEN SATURATION: 100 %

## 2021-03-04 DIAGNOSIS — I47.10 SVT (SUPRAVENTRICULAR TACHYCARDIA) (HCC): ICD-10-CM

## 2021-03-04 DIAGNOSIS — I10 ESSENTIAL HYPERTENSION, BENIGN: ICD-10-CM

## 2021-03-04 DIAGNOSIS — Z86.711 HISTORY OF PULMONARY EMBOLISM: ICD-10-CM

## 2021-03-04 DIAGNOSIS — I50.32 CHRONIC DIASTOLIC (CONGESTIVE) HEART FAILURE (HCC): ICD-10-CM

## 2021-03-04 DIAGNOSIS — E78.00 PURE HYPERCHOLESTEROLEMIA: ICD-10-CM

## 2021-03-04 PROCEDURE — 99214 OFFICE O/P EST MOD 30 MIN: CPT | Performed by: INTERNAL MEDICINE

## 2021-03-04 RX ORDER — CARVEDILOL 6.25 MG/1
6.25 TABLET ORAL 2 TIMES DAILY WITH MEALS
Qty: 180 TABLET | Refills: 3 | Status: SHIPPED | OUTPATIENT
Start: 2021-03-04 | End: 2021-11-30

## 2021-03-04 RX ORDER — FLECAINIDE ACETATE 50 MG/1
50 TABLET ORAL 2 TIMES DAILY
Qty: 180 TABLET | Refills: 3 | Status: SHIPPED | OUTPATIENT
Start: 2021-03-04 | End: 2021-11-30

## 2021-03-04 RX ORDER — FUROSEMIDE 20 MG/1
20 TABLET ORAL
Qty: 30 TABLET | Refills: 3 | Status: SHIPPED | OUTPATIENT
Start: 2021-03-04 | End: 2021-11-30

## 2021-03-04 RX ORDER — FLUTICASONE PROPIONATE 50 MCG
SPRAY, SUSPENSION (ML) NASAL
COMMUNITY
Start: 2021-02-02 | End: 2023-01-19

## 2021-03-04 RX ORDER — AZELASTINE 1 MG/ML
SPRAY, METERED NASAL
COMMUNITY
Start: 2021-02-02 | End: 2023-12-12

## 2021-03-04 RX ORDER — SIMVASTATIN 20 MG
20 TABLET ORAL EVERY EVENING
Qty: 90 TABLET | Refills: 3 | Status: SHIPPED | OUTPATIENT
Start: 2021-03-04 | End: 2021-11-30

## 2021-03-04 RX ORDER — POTASSIUM CHLORIDE 750 MG/1
10 TABLET, FILM COATED, EXTENDED RELEASE ORAL
Qty: 30 TABLET | Refills: 3 | Status: SHIPPED | OUTPATIENT
Start: 2021-03-04 | End: 2021-11-30

## 2021-03-04 RX ORDER — MONTELUKAST SODIUM 10 MG/1
TABLET ORAL
COMMUNITY
Start: 2021-02-01 | End: 2021-03-04

## 2021-03-04 ASSESSMENT — ENCOUNTER SYMPTOMS
SHORTNESS OF BREATH: 1
DIZZINESS: 1

## 2021-03-04 NOTE — PROGRESS NOTES
Cardiology Follow-up Consultation Note    Date of note:    3/4/2021  Primary Care Provider: Debbie Schofield M.D.  Referring Provider: Liborio Davis M.D.    Patient Name: Saba Stallworth   YOB: 1945  MRN:              [id number]    Chief Complaint: palpitations    History of Present Illness: Saba Stallworth is a 75 y.o. female whose current medical problems include hypertension, hyperlipidemia, Heart failure with preserved ejection fraction, CKD stage III, and SVT s/p ablation who is here for follow-up.    At our visit, 1/23/2018:  She continues to have symptomatic palpitations, almost daily.  BP in the 150s-160s/60s-70s. HR ranged from .      In terms of her CHF, her weight is stable on lasix 20mg PO daily, no leg swelling/orthopnea.    In terms of her hypertension, her BP is elevated at home when she has palpitations, but otherwise normal.    In terms of her CKD, no change in urination, she has not had labs in over 6 months.     At our visit, 12/11/2018:  In terms of her SVT, she does have moderate severity palpitations which occur around once a week and lasts about 5 minutes and then resolves spontaneously. Not associated with activity. Does not usually miss medications.     Weight stable on lasix, no ROSE. Going to water aerobics class.     In terms of hypertension, much better controlled.     At our visit, 2/12/2020:  In terms of CHF, no longer taking lasix. Stable weight and no LE swelling.       Interim Events:  She is here with her son Monty.     2 weeks ago she had hospitalization for severe saddle PE which occurred right after her COVID vaccine. This occurred around 5 minutes after her shot.  No longer hypoxemic, can walk around [place] lobby without symptoms.     2 weeks before that she drove to [place] and did have acute SOB and pre-syncope when walking up the stairs once she arrived. This was just a 40 mile drive.     In terms of SVT, no palpitations.      Hypertension well controlled.     Review of Systems   HENT: Positive for hearing loss and tinnitus.    Respiratory: Positive for shortness of breath.    Neurological: Positive for dizziness.   Allergic/Immunologic: Positive for environmental allergies.      Constitution: Negative for chills, fever and night sweats.   HENT: Negative for nosebleeds.    Eyes: Negative for vision loss in left eye and vision loss in right eye.   Respiratory: Negative for hemoptysis.    Gastrointestinal: Negative for hematemesis, hematochezia and melena.   Genitourinary: Negative for hematuria.   Neurological: Negative for focal weakness, numbness and paresthesias.     All other systems reviewed and discussed using a comprehensive questionnaire and are negative.       Past Medical History:   Diagnosis Date   • Arthritis     right thumb and wrist   • Cold 2/7/15    sinus infection   • Hyperlipidemia    • Hypertension    • Hypothyroidism    • Palpitations    • Staph infection 1/2015    right axilla Tx with antibiotics   • SVT (supraventricular tachycardia) (HCC) 2002/2015 2002:Ablation by Dr. Griffin. February 2015: EPS, unable to induce SVT or AT/AF. Maintained on medical therapy.         Past Surgical History:   Procedure Laterality Date   • RECOVERY  2/19/2015    Performed by Cath-Recovery Surgery at SURGERY SAME DAY [facility] ORS   • MASS EXCISION GENERAL  12/12/2014    Performed by Tara Hassan M.D. at SURGERY [facility] ORS   • OTHER CARDIAC SURGERY  2002    cardiac ablation   • HYSTERECTOMY LAPAROSCOPY      Dr. Johnson   • TONSILLECTOMY           Current Outpatient Medications   Medication Sig Dispense Refill   • apixaban (ELIQUIS) 5mg Tab Take 5 mg by mouth 2 Times a Day.     • Montelukast Sodium (SINGULAIR PO) Take  by mouth.     • Calcium Carb-Cholecalciferol (CALCIUM+D3 PO) Take  by mouth.     • carvedilol (COREG) 6.25 MG Tab Take 1 Tab by mouth 2 times a day, with meals. 180 Tab 3   • simvastatin (ZOCOR) 20 MG Tab  Take 1 Tab by mouth every evening. 90 Tab 3   • flecainide (TAMBOCOR) 50 MG tablet Take 1 Tab by mouth 2 times a day. 180 Tab 3   • levothyroxine (SYNTHROID) 50 MCG Tab      • Multiple Vitamin (MULTI-VITAMIN DAILY PO) Take  by mouth every day.     • venlafaxine (EFFEXOR) 75 MG TABS Take 75 mg by mouth every day.     • azelastine (ASTELIN) 137 MCG/SPRAY nasal spray      • fluticasone (FLONASE) 50 MCG/ACT nasal spray      • montelukast (SINGULAIR) 10 MG Tab      • furosemide (LASIX) 20 MG Tab Take 1 Tab by mouth 1 time daily as needed. Take as needed for leg swelling, or weight gain >2 pounds in one day. Take along with potassium. (Patient not taking: Reported on 3/4/2021)       No current facility-administered medications for this visit.         No Known Allergies      Family History   Problem Relation Age of Onset   • Cancer Mother         leiomyosarcoma   • Heart Disease Father    • Heart Failure Father    • Heart Attack Father    • Heart Attack Paternal Grandmother    • Other Maternal Grandmother         scoliosis   • Heart Disease Maternal Uncle    • Heart Attack Maternal Uncle          Social History     Socioeconomic History   • Marital status:      Spouse name: Not on file   • Number of children: Not on file   • Years of education: Not on file   • Highest education level: Not on file   Occupational History   • Not on file   Tobacco Use   • Smoking status: Never Smoker   • Smokeless tobacco: Never Used   Substance and Sexual Activity   • Alcohol use: Yes     Comment: 0-3 per week   • Drug use: No   • Sexual activity: Not on file   Other Topics Concern   • Not on file   Social History Narrative    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      Social Determinants of Health     Financial Resource Strain:    • Difficulty of Paying Living Expenses:    Food Insecurity:    • Worried About Running Out of Food in the Last Year:    • Ran Out of Food in  "the Last Year:    Transportation Needs:    • Lack of Transportation (Medical):    • Lack of Transportation (Non-Medical):    Physical Activity:    • Days of Exercise per Week:    • Minutes of Exercise per Session:    Stress:    • Feeling of Stress :    Social Connections:    • Frequency of Communication with Friends and Family:    • Frequency of Social Gatherings with Friends and Family:    • Attends [employer] Services:    • Active Member of Clubs or Organizations:    • Attends Club or Organization Meetings:    • Marital Status:    Intimate Partner Violence:    • Fear of Current or Ex-Partner:    • Emotionally Abused:    • Physically Abused:    • Sexually Abused:          Physical Exam:  Ambulatory Vitals  /60 (BP Location: Left arm, Patient Position: Sitting, BP Cuff Size: Adult)   Pulse 72   Resp 14   Ht 1.702 m (5' 7\")   Wt 75.8 kg (167 lb)   SpO2 100%    Oxygen Therapy:  Pulse Oximetry: 100 %  BP Readings from Last 4 Encounters:   03/04/21 132/60   02/12/20 124/82   12/11/18 122/82   07/03/18 132/72       Weight/BMI: Body mass index is 26.16 kg/m².  Wt Readings from Last 4 Encounters:   03/04/21 75.8 kg (167 lb)   02/12/20 75.3 kg (166 lb)   12/11/18 75.8 kg (167 lb)   07/03/18 74.8 kg (165 lb)       General: No apparent distress  Eyes: nl conjunctiva  ENT: OP covered by mask  Neck: JVP 4-5 cm H2O, no carotid bruits  Lungs: normal respiratory effort, CTAB  Heart: RRR, no murmurs, no rubs or gallops, no edema bilateral lower extremities. No LV/RV heave on cardiac palpatation. 2+ bilateral radial pulses.   Abdomen: soft, non tender, non distended, no masses, normal bowel sounds.  No HSM.  Extremities/MSK: no clubbing, no cyanosis  Neurological: No focal sensory deficits  Psychiatric: Appropriate affect, A/O x 3  Skin: Warm extremities    Exam repeated in full and unchanged except for as noted above.        Lab Data Review:    OSH labs 6/2017:  Creatinine 1.1, eGFR 48  Potassium 4.1  Sodium 144  tg " 75    HDL 64      OSH labs 1/26/2018:    Creatinine 1.27  eGFR 42  Sodium 140  Potassium 4.3  Bun 16  Mag 2    OSH labs 12/5/2018:  CBC WNL    HDL 60  tg 124    TSH 3.6  Creatinine 1.26  Bun 13  LFTs WNL  Sodium 143  Potassium 4.2  hgbA1c 5.5    OSH labs 2/20/2021:  Potassium 5.1.   Sodium 140  Bun 13  Creatinine 1.3  LFTs WNL  Trop negative  CBC WNL          Cardiac Imaging and Procedures Review:    EKG dated 1/23/2018:  SINUS RHYTHM  FIRST DEGREE AV BLOCK  LEFT AXIS DEVIATION  BORDERLINE R WAVE PROGRESSION, ANTERIOR LEADS    EKG dated 12/11/2018: my personal interpretation NSR, LVH, non-specific st changes.     EKG dated 2/12/2020:  Personal interpretation includes NSR, normal EKG.     Echocardiogram 1/30/2018      TTE 2/2021:  CONCLUSIONS  Aortic sclerosis without stenosis.  Otherwise normal transthoracic echocardiogram.      No prior study is available for comparison.       Holter Monitor (10/2016):   Interpretive Statements   *  Monitoring started at 11:45 AM and continued for 24 hours. Cardiac rhythm   is Sinus with First Degree AV Block. Heart   rate in the range of 54 to 119 BPM. Average heart rate was 74 BPM. The   longest R-R interval was 1.5 seconds.   *  Ventricular ectopic activity consisted of one isolated interpolated PVCs.   *  The patient's rhythm included one hour 55 seconds of sinus bradycardia.   The slowest single episode occurred at   5:28:44 AM, lasting six minutes 20 seconds, with minimum heart rate of 54   BPM.   *  The patient's rhythm also included one hour 48 seconds of sinus   tachycardia. The fastest single episode occurred at   10:02:58 AM, lasting 44 seconds, with maximum heart rate of 119 BPM.   *  Supraventricular ectopic activity consisted of two atrial couplets, 22   single PACs.   *  Diary entries - all symptoms reported in diary were without the time of   occurrence. Unable to document symptoms with times.     IMPRESSION: Rare PACs and PVCs.      Stress test 2013:  6 minutes 25 seconds  Max   Negative.       Radiology test review:  Chest CTA 2021:  Multiple pulm emboli with saddle embolus.     2021: left posterior tibial vein non-occclusive DVT.     Medical Decision Makin. SVT (supraventricular tachycardia) (CMS-HCC)  -Cont flecainde/coreg  -Last stress test negative in , no angina  -EKG Q6 months.     2. Essential hypertension, benign  Well controlled    3. Stage 3 chronic kidney disease  Stable.     4. Chronic diastolic congestive heart failure (CMS-HCC)  NYHA class I.  -Cont lasix 20mg PO Daily prn and potassium prn.     5. Mitral valve prolapse - mild MR.   Not seen on the last echo.  -removed from problem list, no need for f/u.     6. History of pulmonary embolism - saddle PE. Did not receive tPA. Doing quite well. No pre-disposing risk factors  -continue eliquis 5mg PO bid  -will consider genetic work-up at future visits.   -echo in 6 months to r/o pulm hypertension.     Return in about 6 months (around 2021).      Baldemar Bueno MD  [facility]  [facility], [address]  [address] [address]  Phone: [phone]  Fax: [phone]

## 2021-11-30 ENCOUNTER — OFFICE VISIT (OUTPATIENT)
Dept: CARDIOLOGY | Facility: MEDICAL CENTER | Age: 76
End: 2021-11-30
Payer: MEDICARE

## 2021-11-30 VITALS
HEIGHT: 67 IN | RESPIRATION RATE: 16 BRPM | WEIGHT: 166 LBS | DIASTOLIC BLOOD PRESSURE: 76 MMHG | HEART RATE: 64 BPM | OXYGEN SATURATION: 93 % | SYSTOLIC BLOOD PRESSURE: 128 MMHG | BODY MASS INDEX: 26.06 KG/M2

## 2021-11-30 DIAGNOSIS — I34.1 MITRAL VALVE PROLAPSE: ICD-10-CM

## 2021-11-30 DIAGNOSIS — I49.3 VENTRICULAR PREMATURE COMPLEXES: ICD-10-CM

## 2021-11-30 DIAGNOSIS — I26.92 SADDLE EMBOLUS OF PULMONARY ARTERY WITHOUT ACUTE COR PULMONALE, UNSPECIFIED CHRONICITY (HCC): ICD-10-CM

## 2021-11-30 DIAGNOSIS — I50.32 CHRONIC DIASTOLIC (CONGESTIVE) HEART FAILURE (HCC): ICD-10-CM

## 2021-11-30 DIAGNOSIS — I47.10 SVT (SUPRAVENTRICULAR TACHYCARDIA) (HCC): ICD-10-CM

## 2021-11-30 DIAGNOSIS — E03.9 HYPOTHYROIDISM, ACQUIRED: ICD-10-CM

## 2021-11-30 DIAGNOSIS — E78.00 PURE HYPERCHOLESTEROLEMIA: ICD-10-CM

## 2021-11-30 DIAGNOSIS — N18.30 STAGE 3 CHRONIC KIDNEY DISEASE, UNSPECIFIED WHETHER STAGE 3A OR 3B CKD: ICD-10-CM

## 2021-11-30 DIAGNOSIS — I10 ESSENTIAL HYPERTENSION, BENIGN: ICD-10-CM

## 2021-11-30 DIAGNOSIS — Z86.711 HISTORY OF PULMONARY EMBOLISM: ICD-10-CM

## 2021-11-30 DIAGNOSIS — R00.2 PALPITATIONS: ICD-10-CM

## 2021-11-30 DIAGNOSIS — I82.5Z2 CHRONIC DEEP VEIN THROMBOSIS (DVT) OF DISTAL VEIN OF LEFT LOWER EXTREMITY (HCC): ICD-10-CM

## 2021-11-30 PROBLEM — I82.502 CHRONIC DEEP VEIN THROMBOSIS (DVT) OF LEFT LOWER EXTREMITY (HCC): Status: ACTIVE | Noted: 2021-08-05

## 2021-11-30 PROCEDURE — 99214 OFFICE O/P EST MOD 30 MIN: CPT | Performed by: INTERNAL MEDICINE

## 2021-11-30 RX ORDER — MONTELUKAST SODIUM 10 MG/1
TABLET ORAL
COMMUNITY
Start: 2021-10-09 | End: 2023-01-19

## 2021-11-30 RX ORDER — POTASSIUM CHLORIDE 750 MG/1
10 TABLET, FILM COATED, EXTENDED RELEASE ORAL
Qty: 30 TABLET | Refills: 3 | Status: SHIPPED | OUTPATIENT
Start: 2021-11-30 | End: 2023-01-19

## 2021-11-30 RX ORDER — AMOXICILLIN 500 MG/1
CAPSULE ORAL
COMMUNITY
Start: 2021-09-21 | End: 2021-11-30

## 2021-11-30 RX ORDER — SIMVASTATIN 20 MG
20 TABLET ORAL EVERY EVENING
Qty: 90 TABLET | Refills: 3 | Status: SHIPPED | OUTPATIENT
Start: 2021-11-30 | End: 2023-01-19

## 2021-11-30 RX ORDER — TRAMADOL HYDROCHLORIDE 50 MG/1
TABLET ORAL
COMMUNITY
Start: 2021-09-22 | End: 2023-01-19

## 2021-11-30 RX ORDER — FLECAINIDE ACETATE 50 MG/1
50 TABLET ORAL 2 TIMES DAILY
Qty: 180 TABLET | Refills: 3 | Status: SHIPPED | OUTPATIENT
Start: 2021-11-30 | End: 2023-01-19

## 2021-11-30 RX ORDER — OXYCODONE HYDROCHLORIDE 5 MG/1
TABLET ORAL
COMMUNITY
Start: 2021-09-13 | End: 2023-01-19

## 2021-11-30 RX ORDER — CYCLOBENZAPRINE HCL 10 MG
10 TABLET ORAL 3 TIMES DAILY PRN
COMMUNITY
Start: 2021-11-29 | End: 2023-01-19

## 2021-11-30 RX ORDER — CARVEDILOL 6.25 MG/1
6.25 TABLET ORAL 2 TIMES DAILY WITH MEALS
Qty: 180 TABLET | Refills: 3 | Status: SHIPPED | OUTPATIENT
Start: 2021-11-30 | End: 2023-01-19

## 2021-11-30 RX ORDER — FUROSEMIDE 20 MG/1
20 TABLET ORAL
Qty: 30 TABLET | Refills: 3 | Status: SHIPPED | OUTPATIENT
Start: 2021-11-30 | End: 2023-01-19

## 2021-11-30 ASSESSMENT — ENCOUNTER SYMPTOMS
NERVOUS/ANXIOUS: 1
BACK PAIN: 1
DIZZINESS: 1
SPUTUM PRODUCTION: 1

## 2021-11-30 NOTE — Clinical Note
Good afternoon! When you get a moment could we request echocardiogram records from [facility] for this patient? Thank you!

## 2021-11-30 NOTE — PROGRESS NOTES
Cardiology Follow-up Consultation Note    Date of note:    11/30/2021  Primary Care Provider: Debbie Schofield M.D.  Referring Provider: Liborio Davis M.D.    Patient Name: Saba Stallworth   YOB: 1945  MRN:              [id number]    Chief Complaint: palpitations    History of Present Illness: Saba Stallworth is a 76 y.o. female whose current medical problems include hypertension, hyperlipidemia, Heart failure with preserved ejection fraction, CKD stage III, and SVT s/p ablation who is here for follow-up.    At our visit, 1/23/2018:  She continues to have symptomatic palpitations, almost daily.  BP in the 150s-160s/60s-70s. HR ranged from .      In terms of her CHF, her weight is stable on lasix 20mg PO daily, no leg swelling/orthopnea.    In terms of her hypertension, her BP is elevated at home when she has palpitations, but otherwise normal.    In terms of her CKD, no change in urination, she has not had labs in over 6 months.     At our visit, 12/11/2018:  In terms of her SVT, she does have moderate severity palpitations which occur around once a week and lasts about 5 minutes and then resolves spontaneously. Not associated with activity. Does not usually miss medications.     Weight stable on lasix, no ROSE. Going to water aerobics class.     At our visit, 2/12/2020:  In terms of CHF, no longer taking lasix. Stable weight and no LE swelling.     At our visit, 3/4/2021:  She is here with her son Monty.     2 weeks ago she had hospitalization for severe saddle PE which occurred right after her COVID vaccine. This occurred around 5 minutes after her shot.  No longer hypoxemic, can walk around [place] without symptoms.     2 weeks before that she drove to [place] and did have acute SOB and pre-syncope when walking up the stairs once she arrived. This was just a 40 mile drive.     Interim Events:  Did have an episode of syncope on 3/22/2021, presented to ER. Neg  troponin and CTA showed resolution of her PE. Monitored overnight, thought to be orthostatic. Was taking lasix which was then stopped.     In terms of SVT, no palpitations.     Hypertension well controlled.     Did have a fall due to leg spasm which caused a knee injury. Pending surgery next month.      Not exercising due to knee and back.     Occasional leg swelling, not taking lasix.       Review of Systems   HENT: Positive for hearing loss and tinnitus.    Respiratory: Positive for sputum production.    Musculoskeletal: Positive for back pain and joint pain.   Neurological: Positive for dizziness.   Psychiatric/Behavioral: The patient is nervous/anxious.    Allergic/Immunologic: Positive for environmental allergies.      All other systems reviewed and discussed using a comprehensive questionnaire and are negative.       Past Medical History:   Diagnosis Date   • Arthritis     right thumb and wrist   • Cold 2/7/15    sinus infection   • Hyperlipidemia    • Hypertension    • Hypothyroidism    • Palpitations    • Saddle embolus of pulmonary artery without acute cor pulmonale (HCC) 8/5/2021   • Staph infection 1/2015    right axilla Tx with antibiotics   • SVT (supraventricular tachycardia) (HCC) 2002/2015 2002:Ablation by Dr. Griffin. February 2015: EPS, unable to induce SVT or AT/AF. Maintained on medical therapy.         Past Surgical History:   Procedure Laterality Date   • RECOVERY  2/19/2015    Performed by Cath-Recovery Surgery at SURGERY SAME DAY [facility] ORS   • MASS EXCISION GENERAL  12/12/2014    Performed by Tara Hassan M.D. at SURGERY [facility] ORS   • OTHER CARDIAC SURGERY  2002    cardiac ablation   • HYSTERECTOMY LAPAROSCOPY      Dr. Johnson   • TONSILLECTOMY           Current Outpatient Medications   Medication Sig Dispense Refill   • ELIQUIS 2.5 MG Tab Take 2.5 mg by mouth 2 times a day.     • montelukast (SINGULAIR) 10 MG Tab      • cyclobenzaprine (FLEXERIL) 10 mg Tab Take 10 mg by  mouth 3 times a day as needed.     • Folic Acid-Vit B6-Vit B12 (FOLBEE PO) Take  by mouth.     • Calcium Carb-Cholecalciferol (CALCIUM+D3 PO) Take  by mouth.     • azelastine (ASTELIN) 137 MCG/SPRAY nasal spray      • fluticasone (FLONASE) 50 MCG/ACT nasal spray      • flecainide (TAMBOCOR) 50 MG tablet Take 1 tablet by mouth 2 times a day. 180 tablet 3   • carvedilol (COREG) 6.25 MG Tab Take 1 tablet by mouth 2 times a day, with meals. 180 tablet 3   • simvastatin (ZOCOR) 20 MG Tab Take 1 tablet by mouth every evening. 90 tablet 3   • levothyroxine (SYNTHROID) 50 MCG Tab Take 50 mcg by mouth every morning on an empty stomach.     • Multiple Vitamin (MULTI-VITAMIN DAILY PO) Take  by mouth every day.     • venlafaxine (EFFEXOR) 75 MG TABS Take 75 mg by mouth every day.     • traMADol (ULTRAM) 50 MG Tab  (Patient not taking: Reported on 11/30/2021)     • amoxicillin (AMOXIL) 500 MG Cap  (Patient not taking: Reported on 11/30/2021)     • oxyCODONE immediate-release (ROXICODONE) 5 MG Tab      • apixaban (ELIQUIS) 5mg Tab Take 5 mg by mouth 2 Times a Day. (Patient not taking: Reported on 11/30/2021)     • Montelukast Sodium (SINGULAIR PO) Take  by mouth. (Patient not taking: Reported on 11/30/2021)     • furosemide (LASIX) 20 MG Tab Take 1 tablet by mouth 1 time a day as needed. Take as needed for leg swelling, or weight gain >2 pounds in one day. Take along with potassium. (Patient not taking: Reported on 11/30/2021) 30 tablet 3   • potassium chloride ER (KLOR-CON) 10 MEQ tablet Take 1 tablet by mouth 1 time a day as needed (Take as needed for leg swelling, or weight gain >2 pounds in one day. Take along with lasix). (Patient not taking: Reported on 11/30/2021) 30 tablet 3     No current facility-administered medications for this visit.         No Known Allergies      Family History   Problem Relation Age of Onset   • Cancer Mother         leiomyosarcoma   • Heart Disease Father    • Heart Failure Father    • Heart  Attack Father    • Heart Attack Paternal Grandmother    • Other Maternal Grandmother         scoliosis   • Heart Disease Maternal Uncle    • Heart Attack Maternal Uncle          Social History     Socioeconomic History   • Marital status:      Spouse name: Not on file   • Number of children: Not on file   • Years of education: Not on file   • Highest education level: Not on file   Occupational History   • Not on file   Tobacco Use   • Smoking status: Never Smoker   • Smokeless tobacco: Never Used   Substance and Sexual Activity   • Alcohol use: Yes     Comment: 3 per week   • Drug use: No   • Sexual activity: Not on file   Other Topics Concern   • Not on file   Social History Narrative    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      Social Determinants of Health     Financial Resource Strain:    • Difficulty of Paying Living Expenses: Not on file   Food Insecurity:    • Worried About Running Out of Food in the Last Year: Not on file   • Ran Out of Food in the Last Year: Not on file   Transportation Needs:    • Lack of Transportation (Medical): Not on file   • Lack of Transportation (Non-Medical): Not on file   Physical Activity:    • Days of Exercise per Week: Not on file   • Minutes of Exercise per Session: Not on file   Stress:    • Feeling of Stress : Not on file   Social Connections:    • Frequency of Communication with Friends and Family: Not on file   • Frequency of Social Gatherings with Friends and Family: Not on file   • Attends Worship Services: Not on file   • Active Member of Clubs or Organizations: Not on file   • Attends Club or Organization Meetings: Not on file   • Marital Status: Not on file   Intimate Partner Violence:    • Fear of Current or Ex-Partner: Not on file   • Emotionally Abused: Not on file   • Physically Abused: Not on file   • Sexually Abused: Not on file   Housing Stability:    • Unable to Pay for Housing in the  "Last Year: Not on file   • Number of Places Lived in the Last Year: Not on file   • Unstable Housing in the Last Year: Not on file         Physical Exam:  Ambulatory Vitals  /76 (BP Location: Left arm, Patient Position: Sitting, BP Cuff Size: Adult)   Pulse 64   Resp 16   Ht 1.702 m (5' 7\")   Wt 75.3 kg (166 lb)   SpO2 93%    Oxygen Therapy:  Pulse Oximetry: 93 %  BP Readings from Last 4 Encounters:   11/30/21 128/76   03/04/21 132/60   02/12/20 124/82   12/11/18 122/82       Weight/BMI: Body mass index is 26 kg/m².  Wt Readings from Last 4 Encounters:   11/30/21 75.3 kg (166 lb)   03/04/21 75.8 kg (167 lb)   02/12/20 75.3 kg (166 lb)   12/11/18 75.8 kg (167 lb)       General: No apparent distress  Eyes: nl conjunctiva  ENT: OP covered by mask  Neck: JVP <8 cm H2O  Lungs: normal respiratory effort, CTAB  Heart: RRR, no murmurs, no rubs or gallops, no edema bilateral lower extremities. No LV/RV heave on cardiac palpatation. 2+ bilateral radial pulses.   Abdomen: soft, non tender, non distended, no masses, normal bowel sounds.  No HSM.  Extremities/MSK: no clubbing, no cyanosis  Neurological: No focal sensory deficits  Psychiatric: Appropriate affect, A/O x 3  Skin: Warm extremities    Exam repeated in full and unchanged except for as noted above.        Lab Data Review:    OSH labs 6/2017:  Creatinine 1.1, eGFR 48  Potassium 4.1  Sodium 144  tg 75    HDL 64      OSH labs 1/26/2018:    Creatinine 1.27  eGFR 42  Sodium 140  Potassium 4.3  Bun 16  Mag 2    OSH labs 12/5/2018:  CBC WNL    HDL 60  tg 124    TSH 3.6  Creatinine 1.26  Bun 13  LFTs WNL  Sodium 143  Potassium 4.2  hgbA1c 5.5    OSH labs 2/20/2021:  Potassium 5.1.   Sodium 140  Bun 13  Creatinine 1.3  LFTs WNL  Trop negative  CBC WNL          Cardiac Imaging and Procedures Review:    EKG dated 1/23/2018:  SINUS RHYTHM  FIRST DEGREE AV BLOCK  LEFT AXIS DEVIATION  BORDERLINE R WAVE PROGRESSION, ANTERIOR LEADS    EKG " dated 2018: my personal interpretation NSR, LVH, non-specific st changes.     EKG dated 2020:  Personal interpretation includes NSR, normal EKG.     Echocardiogram 2018      TTE 2021:  CONCLUSIONS  Aortic sclerosis without stenosis.  Otherwise normal transthoracic echocardiogram.      No prior study is available for comparison.       Holter Monitor (10/2016):   Interpretive Statements   *  Monitoring started at 11:45 AM and continued for 24 hours. Cardiac rhythm   is Sinus with First Degree AV Block. Heart   rate in the range of 54 to 119 BPM. Average heart rate was 74 BPM. The   longest R-R interval was 1.5 seconds.   *  Ventricular ectopic activity consisted of one isolated interpolated PVCs.   *  The patient's rhythm included one hour 55 seconds of sinus bradycardia.   The slowest single episode occurred at   5:28:44 AM, lasting six minutes 20 seconds, with minimum heart rate of 54   BPM.   *  The patient's rhythm also included one hour 48 seconds of sinus   tachycardia. The fastest single episode occurred at   10:02:58 AM, lasting 44 seconds, with maximum heart rate of 119 BPM.   *  Supraventricular ectopic activity consisted of two atrial couplets, 22   single PACs.   *  Diary entries - all symptoms reported in diary were without the time of   occurrence. Unable to document symptoms with times.     IMPRESSION: Rare PACs and PVCs.     Stress test :  6 minutes 25 seconds  Max   Negative.       Radiology test review:  Chest CTA 2021:  Multiple pulm emboli with saddle embolus.     2021: left posterior tibial vein non-occclusive DVT.     Medical Decision Makin. SVT (supraventricular tachycardia) (CMS-HCC)  -Cont flecainde/coreg  -Last stress test negative in , no angina  -EKG Q6 months.     2. Essential hypertension, benign  Well controlled    3. Stage 3 chronic kidney disease  Stable.      4. Chronic diastolic congestive heart failure (CMS-HCC)  NYHA class I.   -Cont  lasix 20mg PO Daily prn and potassium prn.     5. Mitral valve prolapse - mild MR.   Not seen on the last echo.  -removed from problem list, no need for f/u.     6. History of pulmonary embolism - saddle PE. Did not receive tPA. Doing quite well. No pre-disposing risk factors  -continue eliquis 2.5mg PO bid  -will consider genetic work-up at future visits.   -request echo results from [facility].     Return in about 1 year (around 11/30/2022).      Baldemar Bueno MD  [facility]  [facility], [address]  [address] [address]  Phone: [phone]  Fax: [phone]

## 2021-12-07 ENCOUNTER — TELEPHONE (OUTPATIENT)
Dept: CARDIOLOGY | Facility: MEDICAL CENTER | Age: 76
End: 2021-12-07

## 2021-12-07 NOTE — TELEPHONE ENCOUNTER
Faxed a records request to [facility].   Fax#:[phone] Phone:[phone].  Records to be sent to fax#: [phone].

## 2023-01-19 ENCOUNTER — OFFICE VISIT (OUTPATIENT)
Dept: CARDIOLOGY | Facility: MEDICAL CENTER | Age: 78
End: 2023-01-19
Payer: MEDICARE

## 2023-01-19 ENCOUNTER — TELEPHONE (OUTPATIENT)
Dept: CARDIOLOGY | Facility: MEDICAL CENTER | Age: 78
End: 2023-01-19

## 2023-01-19 VITALS
HEART RATE: 66 BPM | SYSTOLIC BLOOD PRESSURE: 132 MMHG | WEIGHT: 166 LBS | DIASTOLIC BLOOD PRESSURE: 70 MMHG | OXYGEN SATURATION: 100 % | BODY MASS INDEX: 26.06 KG/M2 | RESPIRATION RATE: 16 BRPM | HEIGHT: 67 IN

## 2023-01-19 DIAGNOSIS — I10 ESSENTIAL HYPERTENSION, BENIGN: ICD-10-CM

## 2023-01-19 DIAGNOSIS — E78.00 PURE HYPERCHOLESTEROLEMIA: ICD-10-CM

## 2023-01-19 DIAGNOSIS — I50.32 CHRONIC DIASTOLIC (CONGESTIVE) HEART FAILURE (HCC): ICD-10-CM

## 2023-01-19 DIAGNOSIS — I47.10 SVT (SUPRAVENTRICULAR TACHYCARDIA) (HCC): ICD-10-CM

## 2023-01-19 PROCEDURE — 99214 OFFICE O/P EST MOD 30 MIN: CPT | Performed by: INTERNAL MEDICINE

## 2023-01-19 PROCEDURE — 93000 ELECTROCARDIOGRAM COMPLETE: CPT | Performed by: INTERNAL MEDICINE

## 2023-01-19 RX ORDER — CARVEDILOL 6.25 MG/1
6.25 TABLET ORAL 2 TIMES DAILY WITH MEALS
Qty: 200 TABLET | Refills: 3 | Status: SHIPPED | OUTPATIENT
Start: 2023-01-19 | End: 2023-12-12

## 2023-01-19 RX ORDER — POTASSIUM CHLORIDE 750 MG/1
10 TABLET, FILM COATED, EXTENDED RELEASE ORAL
Qty: 100 TABLET | Refills: 3 | Status: SHIPPED | OUTPATIENT
Start: 2023-01-19 | End: 2023-12-12

## 2023-01-19 RX ORDER — FUROSEMIDE 20 MG/1
20 TABLET ORAL
Qty: 30 TABLET | Refills: 3 | Status: SHIPPED | OUTPATIENT
Start: 2023-01-19 | End: 2023-12-12

## 2023-01-19 RX ORDER — FLECAINIDE ACETATE 50 MG/1
50 TABLET ORAL 2 TIMES DAILY
Qty: 200 TABLET | Refills: 3 | Status: SHIPPED | OUTPATIENT
Start: 2023-01-19 | End: 2023-12-12

## 2023-01-19 RX ORDER — SIMVASTATIN 20 MG
20 TABLET ORAL EVERY EVENING
Qty: 100 TABLET | Refills: 3 | Status: SHIPPED | OUTPATIENT
Start: 2023-01-19 | End: 2023-12-12

## 2023-01-19 NOTE — PROGRESS NOTES
Cardiology Follow-up Consultation Note    Date of note:    1/19/2023  Primary Care Provider: Debbie Schofield M.D.  Referring Provider: Liborio Davis M.D.    Patient Name: Saba Stallworth   YOB: 1945  MRN:              [id number]    Chief Complaint: chest pressure    History of Present Illness: Saba Stallworth is a 77 y.o. female whose current medical problems include hypertension, hyperlipidemia, Heart failure with preserved ejection fraction, CKD stage III, and SVT s/p ablation who is here for follow-up.    At our visit, 1/23/2018:  She continues to have symptomatic palpitations, almost daily.  BP in the 150s-160s/60s-70s. HR ranged from .      In terms of her CHF, her weight is stable on lasix 20mg PO daily, no leg swelling/orthopnea.    In terms of her hypertension, her BP is elevated at home when she has palpitations, but otherwise normal.    In terms of her CKD, no change in urination, she has not had labs in over 6 months.     At our visit, 12/11/2018:  In terms of her SVT, she does have moderate severity palpitations which occur around once a week and lasts about 5 minutes and then resolves spontaneously. Not associated with activity. Does not usually miss medications.     Weight stable on lasix, no ROSE. Going to water aerobics class.     At our visit, 2/12/2020:  In terms of CHF, no longer taking lasix. Stable weight and no LE swelling.     At our visit, 3/4/2021:  She is here with her son Monty.     2 weeks ago she had hospitalization for severe saddle PE which occurred right after her COVID vaccine. This occurred around 5 minutes after her shot.  No longer hypoxemic, can walk around [place] without symptoms.     2 weeks before that she drove to [place] and did have acute SOB and pre-syncope when walking up the stairs once she arrived. This was just a 40 mile drive.     At our visit, 11/30/2021:  Did have an episode of syncope on 3/22/2021, presented to  ER. Neg troponin and CTA showed resolution of her PE. Monitored overnight, thought to be orthostatic. Was taking lasix which was then stopped.     In terms of SVT, no palpitations.     Hypertension well controlled.     Did have a fall due to leg spasm which caused a knee injury. Pending surgery next month.      Not exercising due to knee and back.     Occasional leg swelling, not taking lasix.     Interim Events:  Bps in the 130s-140s at home.     Did have some body cramps, resolved with taking potassium.     Taking lasix rarely.     Rare palpitations, mild severity.     Walked around a mile yesterday, otherwise rarely exercising. No symptoms.     Weight up 10 pounds over last 6 months.       ROS   + fatigue, weight gain, back pain, falls.       Past Medical History:   Diagnosis Date    Arthritis     right thumb and wrist    Cold 2/7/15    sinus infection    Hyperlipidemia     Hypertension     Hypothyroidism     Palpitations     Saddle embolus of pulmonary artery without acute cor pulmonale (HCC) 8/5/2021    Staph infection 1/2015    right axilla Tx with antibiotics    SVT (supraventricular tachycardia) ([facility]) 2002/2015 2002:Ablation by Dr. Griffin. February 2015: EPS, unable to induce SVT or AT/AF. Maintained on medical therapy.         Past Surgical History:   Procedure Laterality Date    RECOVERY  2/19/2015    Performed by Cath-Recovery Surgery at SURGERY SAME DAY [facility] ORS    MASS EXCISION GENERAL  12/12/2014    Performed by Tara Hassan M.D. at SURGERY [facility]    OTHER CARDIAC SURGERY  2002    cardiac ablation    HYSTERECTOMY LAPAROSCOPY      Dr. Johnson    TONSILLECTOMY           Current Outpatient Medications   Medication Sig Dispense Refill    ELIQUIS 2.5 MG Tab Take 2.5 mg by mouth 2 times a day.      Folic Acid-Vit B6-Vit B12 (FOLBEE PO) Take  by mouth.      simvastatin (ZOCOR) 20 MG Tab Take 1 Tablet by mouth every evening. 90 Tablet 3    potassium chloride ER (KLOR-CON) 10 MEQ tablet  Take 1 Tablet by mouth 1 time a day as needed (Take as needed for leg swelling, or weight gain >2 pounds in one day. Take along with lasix). 30 Tablet 3    furosemide (LASIX) 20 MG Tab Take 1 Tablet by mouth 1 time a day as needed. Take as needed for leg swelling, or weight gain >2 pounds in one day. Take along with potassium. 30 Tablet 3    flecainide (TAMBOCOR) 50 MG tablet Take 1 Tablet by mouth 2 times a day. 180 Tablet 3    carvedilol (COREG) 6.25 MG Tab Take 1 Tablet by mouth 2 times a day with meals. 180 Tablet 3    Calcium Carb-Cholecalciferol (CALCIUM+D3 PO) Take  by mouth.      Multiple Vitamin (MULTI-VITAMIN DAILY PO) Take  by mouth every day.      venlafaxine (EFFEXOR) 75 MG TABS Take 75 mg by mouth 2 times a day.      azelastine (ASTELIN) 137 MCG/SPRAY nasal spray  (Patient not taking: Reported on 1/19/2023)      levothyroxine (SYNTHROID) 50 MCG Tab Take 50 mcg by mouth every morning on an empty stomach.       No current facility-administered medications for this visit.         No Known Allergies      Family History   Problem Relation Age of Onset    Cancer Mother         leiomyosarcoma    Heart Disease Father     Heart Failure Father     Heart Attack Father     Heart Attack Paternal Grandmother     Other Maternal Grandmother         scoliosis    Heart Disease Maternal Uncle     Heart Attack Maternal Uncle          Social History     Socioeconomic History    Marital status:      Spouse name: Not on file    Number of children: Not on file    Years of education: Not on file    Highest education level: Not on file   Occupational History    Not on file   Tobacco Use    Smoking status: Never    Smokeless tobacco: Never   Substance and Sexual Activity    Alcohol use: Yes     Comment: 3 per week    Drug use: No    Sexual activity: Not on file   Other Topics Concern    Not on file   Social History Narrative    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      Social Determinants of Health     Financial Resource Strain: Not on file   Food Insecurity: Not on file   Transportation Needs: Not on file   Physical Activity: Not on file   Stress: Not on file   Social Connections: Not on file   Intimate Partner Violence: Not on file   Housing Stability: Not on file         Physical Exam:  Ambulatory Vitals  /70 (BP Location: Left arm, Patient Position: Sitting)   Pulse (!) 110   Resp 16   Ht 1.702 m (5' 7\")   Wt 75.3 kg (166 lb)   SpO2 89%    Oxygen Therapy:  Pulse Oximetry: 89 %  BP Readings from Last 4 Encounters:   01/19/23 132/70   11/30/21 128/76   03/04/21 132/60   02/12/20 124/82       Weight/BMI: Body mass index is 26 kg/m².  Wt Readings from Last 4 Encounters:   01/19/23 75.3 kg (166 lb)   11/30/21 75.3 kg (166 lb)   03/04/21 75.8 kg (167 lb)   02/12/20 75.3 kg (166 lb)       General: No apparent distress  Eyes: nl conjunctiva  ENT: OP covered by mask  Neck: JVP <8 cm H2O  Lungs: normal respiratory effort, CTAB  Heart: RRR, no murmurs, no rubs or gallops, 1+ edema bilateral lower extremities. No LV/RV heave on cardiac palpatation. 2+ bilateral radial pulses.   Abdomen: soft, non tender, non distended, no masses, normal bowel sounds.  No HSM.  Extremities/MSK: no clubbing, no cyanosis  Neurological: No focal sensory deficits  Psychiatric: Appropriate affect, A/O x 3  Skin: Warm extremities    Exam repeated in full and unchanged except for as noted above.      Lab Data Review:  OSH labs 6/2017:  Creatinine 1.1, eGFR 48  Potassium 4.1  Sodium 144  tg 75    HDL 64      OSH labs 1/26/2018:    Creatinine 1.27  eGFR 42  Sodium 140  Potassium 4.3  Bun 16  Mag 2    OSH labs 12/5/2018:  CBC WNL    HDL 60  tg 124    TSH 3.6  Creatinine 1.26  Bun 13  LFTs WNL  Sodium 143  Potassium 4.2  hgbA1c 5.5    OSH labs 2/20/2021:  Potassium 5.1.   Sodium 140  Bun 13  Creatinine 1.3  LFTs WNL  Trop negative  CBC WNL          Cardiac " Imaging and Procedures Review:    EKG dated 2018:  SINUS RHYTHM  FIRST DEGREE AV BLOCK  LEFT AXIS DEVIATION  BORDERLINE R WAVE PROGRESSION, ANTERIOR LEADS    EKG dated 2018: my personal interpretation NSR, LVH, non-specific st changes.     EKG dated 2020:  Personal interpretation includes NSR, normal EKG.     Echocardiogram 2018      TTE 2021:  CONCLUSIONS  Aortic sclerosis without stenosis.  Otherwise normal transthoracic echocardiogram.      No prior study is available for comparison.     Echo [facility] 2021:  LVEF 65%.     Holter Monitor (10/2016):   Interpretive Statements   *  Monitoring started at 11:45 AM and continued for 24 hours. Cardiac rhythm   is Sinus with First Degree AV Block. Heart   rate in the range of 54 to 119 BPM. Average heart rate was 74 BPM. The   longest R-R interval was 1.5 seconds.   *  Ventricular ectopic activity consisted of one isolated interpolated PVCs.   *  The patient's rhythm included one hour 55 seconds of sinus bradycardia.   The slowest single episode occurred at   5:28:44 AM, lasting six minutes 20 seconds, with minimum heart rate of 54   BPM.   *  The patient's rhythm also included one hour 48 seconds of sinus   tachycardia. The fastest single episode occurred at   10:02:58 AM, lasting 44 seconds, with maximum heart rate of 119 BPM.   *  Supraventricular ectopic activity consisted of two atrial couplets, 22   single PACs.   *  Diary entries - all symptoms reported in diary were without the time of   occurrence. Unable to document symptoms with times.     IMPRESSION: Rare PACs and PVCs.     Stress test :  6 minutes 25 seconds  Max   Negative.       Radiology test review:  Chest CTA 2021:  Multiple pulm emboli with saddle embolus.     2021: left posterior tibial vein non-occclusive DVT.     Medical Decision Makin. SVT (supraventricular tachycardia) (CMS-HCC)  -Cont flecainde/coreg  -Last stress test negative in , no angina  -EKG  Q6 months. Will check today.     2. Essential hypertension, benign  Well controlled    3. Stage 3 chronic kidney disease  Stable.      4. Chronic diastolic congestive heart failure (CMS-[facility])  NYHA class I.   -Cont lasix 20mg PO Daily prn and potassium prn. Advised she take these around once a week.     5. Mitral valve prolapse - mild MR.   Not seen on the last echo.  -removed from problem list, no need for f/u.     6. History of pulmonary embolism - saddle PE. Did not receive tPA. Doing quite well. No pre-disposing risk factors  -continue eliquis 2.5mg PO bid  -will consider genetic work-up at future visits.   -echo from [facility] showed normal biventricular EF.     7. Chest pain - atypical, could be a little bit of orthopnea.  -lasix x 2 days  -ER precautions.      Return in about 1 year (around 1/19/2024).      Baldemar Bueno MD  [facility]  [place] [facility], [address]  [address] [address]  Phone: [phone]  Fax: [phone]

## 2023-01-21 LAB — EKG IMPRESSION: NORMAL

## 2023-06-21 ENCOUNTER — HOSPITAL ENCOUNTER (OUTPATIENT)
Facility: MEDICAL CENTER | Age: 78
End: 2023-06-21
Attending: PHYSICIAN ASSISTANT
Payer: MEDICARE

## 2023-06-21 PROCEDURE — 87075 CULTR BACTERIA EXCEPT BLOOD: CPT

## 2023-06-21 PROCEDURE — 87070 CULTURE OTHR SPECIMN AEROBIC: CPT

## 2023-06-21 PROCEDURE — 87205 SMEAR GRAM STAIN: CPT

## 2023-06-22 LAB
GRAM STN SPEC: NORMAL
SIGNIFICANT IND 70042: NORMAL
SITE SITE: NORMAL
SOURCE SOURCE: NORMAL

## 2023-06-25 LAB
BACTERIA SPEC ANAEROBE CULT: NORMAL
BACTERIA WND AEROBE CULT: NORMAL
GRAM STN SPEC: NORMAL
SIGNIFICANT IND 70042: NORMAL
SIGNIFICANT IND 70042: NORMAL
SITE SITE: NORMAL
SITE SITE: NORMAL
SOURCE SOURCE: NORMAL
SOURCE SOURCE: NORMAL

## 2023-08-16 ENCOUNTER — HOSPITAL ENCOUNTER (OUTPATIENT)
Facility: MEDICAL CENTER | Age: 78
End: 2023-08-16
Attending: OTOLARYNGOLOGY
Payer: MEDICARE

## 2023-08-16 PROCEDURE — 87205 SMEAR GRAM STAIN: CPT

## 2023-08-16 PROCEDURE — 87070 CULTURE OTHR SPECIMN AEROBIC: CPT

## 2023-08-16 PROCEDURE — 87075 CULTR BACTERIA EXCEPT BLOOD: CPT

## 2023-08-17 LAB
GRAM STN SPEC: NORMAL
SIGNIFICANT IND 70042: NORMAL
SITE SITE: NORMAL
SOURCE SOURCE: NORMAL

## 2023-08-19 LAB
BACTERIA WND AEROBE CULT: NORMAL
GRAM STN SPEC: NORMAL
SIGNIFICANT IND 70042: NORMAL
SITE SITE: NORMAL
SOURCE SOURCE: NORMAL

## 2023-08-20 LAB
BACTERIA SPEC ANAEROBE CULT: NORMAL
SIGNIFICANT IND 70042: NORMAL
SITE SITE: NORMAL
SOURCE SOURCE: NORMAL

## 2023-12-12 ENCOUNTER — OFFICE VISIT (OUTPATIENT)
Dept: CARDIOLOGY | Facility: MEDICAL CENTER | Age: 78
End: 2023-12-12
Attending: INTERNAL MEDICINE
Payer: MEDICARE

## 2023-12-12 VITALS
RESPIRATION RATE: 12 BRPM | HEART RATE: 70 BPM | DIASTOLIC BLOOD PRESSURE: 72 MMHG | HEIGHT: 67 IN | OXYGEN SATURATION: 96 % | SYSTOLIC BLOOD PRESSURE: 102 MMHG | BODY MASS INDEX: 24.17 KG/M2 | WEIGHT: 154 LBS

## 2023-12-12 DIAGNOSIS — I10 ESSENTIAL HYPERTENSION, BENIGN: ICD-10-CM

## 2023-12-12 DIAGNOSIS — I49.3 VENTRICULAR PREMATURE COMPLEXES: ICD-10-CM

## 2023-12-12 DIAGNOSIS — E78.00 PURE HYPERCHOLESTEROLEMIA: ICD-10-CM

## 2023-12-12 DIAGNOSIS — N18.30 STAGE 3 CHRONIC KIDNEY DISEASE, UNSPECIFIED WHETHER STAGE 3A OR 3B CKD: ICD-10-CM

## 2023-12-12 DIAGNOSIS — I47.10 SVT (SUPRAVENTRICULAR TACHYCARDIA) (HCC): ICD-10-CM

## 2023-12-12 DIAGNOSIS — R00.2 PALPITATIONS: ICD-10-CM

## 2023-12-12 DIAGNOSIS — I82.5Z2 CHRONIC DEEP VEIN THROMBOSIS (DVT) OF DISTAL VEIN OF LEFT LOWER EXTREMITY (HCC): ICD-10-CM

## 2023-12-12 PROCEDURE — 3074F SYST BP LT 130 MM HG: CPT | Performed by: INTERNAL MEDICINE

## 2023-12-12 PROCEDURE — 3078F DIAST BP <80 MM HG: CPT | Performed by: INTERNAL MEDICINE

## 2023-12-12 PROCEDURE — 93005 ELECTROCARDIOGRAM TRACING: CPT | Performed by: INTERNAL MEDICINE

## 2023-12-12 PROCEDURE — 99213 OFFICE O/P EST LOW 20 MIN: CPT | Performed by: INTERNAL MEDICINE

## 2023-12-12 PROCEDURE — 99214 OFFICE O/P EST MOD 30 MIN: CPT | Performed by: INTERNAL MEDICINE

## 2023-12-12 RX ORDER — HYDROCODONE BITARTRATE AND ACETAMINOPHEN 10; 325 MG/1; MG/1
TABLET ORAL
COMMUNITY
Start: 2023-10-10

## 2023-12-12 RX ORDER — CARVEDILOL 6.25 MG/1
6.25 TABLET ORAL 2 TIMES DAILY WITH MEALS
Qty: 200 TABLET | Refills: 3 | Status: SHIPPED | OUTPATIENT
Start: 2023-12-12

## 2023-12-12 RX ORDER — FUROSEMIDE 20 MG/1
20 TABLET ORAL
Qty: 30 TABLET | Refills: 3 | Status: SHIPPED | OUTPATIENT
Start: 2023-12-12

## 2023-12-12 RX ORDER — ONDANSETRON 4 MG/1
TABLET, FILM COATED ORAL
COMMUNITY
Start: 2023-10-14

## 2023-12-12 RX ORDER — SIMVASTATIN 20 MG
20 TABLET ORAL EVERY EVENING
Qty: 100 TABLET | Refills: 3 | Status: SHIPPED | OUTPATIENT
Start: 2023-12-12

## 2023-12-12 RX ORDER — POTASSIUM CHLORIDE 750 MG/1
10 TABLET, FILM COATED, EXTENDED RELEASE ORAL
Qty: 30 TABLET | Refills: 3 | Status: SHIPPED | OUTPATIENT
Start: 2023-12-12

## 2023-12-12 RX ORDER — FLECAINIDE ACETATE 50 MG/1
50 TABLET ORAL 2 TIMES DAILY
Qty: 200 TABLET | Refills: 3 | Status: SHIPPED | OUTPATIENT
Start: 2023-12-12

## 2023-12-12 NOTE — LETTER
[facility]   [address]   [address]  [place] [address]  Phone: [phone]  Fax: [phone]              Saba Stallworth  1945    Encounter Date: 12/12/2023    Baldemar Bueno M.D.          PROGRESS NOTE:       Cardiology Follow-up Consultation Note    Date of note:  12/12/2023  Primary Care Provider: Debbie Schofield M.D.  Referring Provider: Liborio Davis M.D.    Patient Name: Sbaa Stallworth   YOB: 1945  MRN:              [id number]    Chief Complaint: SOB    History of Present Illness: Saba Stallworth is a 78 y.o. female whose current medical problems include hypertension, hyperlipidemia, Heart failure with preserved ejection fraction, CKD stage III, and SVT s/p ablation who is here for follow-up.    At our visit, 1/23/2018:  She continues to have symptomatic palpitations, almost daily.  BP in the 150s-160s/60s-70s. HR ranged from .      In terms of her CHF, her weight is stable on lasix 20mg PO daily, no leg swelling/orthopnea.    In terms of her hypertension, her BP is elevated at home when she has palpitations, but otherwise normal.    In terms of her CKD, no change in urination, she has not had labs in over 6 months.     At our visit, 12/11/2018:  In terms of her SVT, she does have moderate severity palpitations which occur around once a week and lasts about 5 minutes and then resolves spontaneously. Not associated with activity. Does not usually miss medications.     Weight stable on lasix, no ROSE. Going to water aerobics class.     At our visit, 2/12/2020:  In terms of CHF, no longer taking lasix. Stable weight and no LE swelling.     At our visit, 3/4/2021:  She is here with her son Monty.     2 weeks ago she had hospitalization for severe saddle PE which occurred right after her COVID vaccine. This occurred around 5 minutes after her  shot.  No longer hypoxemic, can walk around [place] without symptoms.     2 weeks before that she drove to [place] and did have acute SOB and pre-syncope when walking up the stairs once she arrived. This was just a 40 mile drive.     At our visit, 11/30/2021:  Did have an episode of syncope on 3/22/2021, presented to ER. Neg troponin and CTA showed resolution of her PE. Monitored overnight, thought to be orthostatic. Was taking lasix which was then stopped.     In terms of SVT, no palpitations.     Hypertension well controlled.     Did have a fall due to leg spasm which caused a knee injury. Pending surgery next month.      Not exercising due to knee and back.     Occasional leg swelling, not taking lasix.     At our visit, 1/19/2023:  Bps in the 130s-140s at home.     Did have some body cramps, resolved with taking potassium.     Taking lasix rarely.     Rare palpitations, mild severity.     Walked around a mile yesterday, otherwise rarely exercising. No symptoms.     Weight up 10 pounds over last 6 months.     Interim Events:  Had back surgery complicated by DVT/PE. On eliquis now.     On CPAP now.     Some SOB when lying flat.     BP well controlled.     Patient denies chest pain, palpitations, dyspnea on exertion, pre-syncope, syncope, lower extremity swelling, orthopnea, PND or recent weight gain.       ROS   No fevers, CVA symptoms, bleeding issues.     Past Medical History:   Diagnosis Date   • Arthritis     right thumb and wrist   • Cold 2/7/15    sinus infection   • Hyperlipidemia    • Hypertension    • Hypothyroidism    • Palpitations    • Saddle embolus of pulmonary artery without acute cor pulmonale (HCC) 8/5/2021   • Staph infection 1/2015    right axilla Tx with antibiotics   • SVT (supraventricular tachycardia) 2002/2015 2002:Ablation by Dr. Griffin. February 2015: EPS, unable to induce SVT or AT/AF. Maintained on medical therapy.         Past Surgical History:   Procedure Laterality Date    • RECOVERY  2/19/2015    Performed by Cath-Recovery Surgery at SURGERY SAME DAY [facility] ORS   • MASS EXCISION GENERAL  12/12/2014    Performed by Tara Hassan M.D. at SURGERY [facility] ORS   • OTHER CARDIAC SURGERY  2002    cardiac ablation   • HYSTERECTOMY LAPAROSCOPY      Dr. Johnson   • TONSILLECTOMY           Current Outpatient Medications   Medication Sig Dispense Refill   • HYDROcodone/acetaminophen (NORCO)  MG Tab TAKE 1 TABLET BY MOUTH EVERY 4 HOURS AS NEEDED FOR PAIN FOR 5 DAYS     • ondansetron (ZOFRAN) 4 MG Tab tablet      • simvastatin (ZOCOR) 20 MG Tab Take 1 Tablet by mouth every evening. 100 Tablet 3   • flecainide (TAMBOCOR) 50 MG tablet Take 1 Tablet by mouth 2 times a day. 200 Tablet 3   • carvedilol (COREG) 6.25 MG Tab Take 1 Tablet by mouth 2 times a day with meals. 200 Tablet 3   • apixaban (ELIQUIS) 5mg Tab Take 5 mg by mouth 2 times a day.     • Folic Acid-Vit B6-Vit B12 (FOLBEE PO) Take  by mouth.     • Calcium Carb-Cholecalciferol (CALCIUM+D3 PO) Take  by mouth.     • levothyroxine (SYNTHROID) 50 MCG Tab Take 50 mcg by mouth every morning on an empty stomach.     • Multiple Vitamin (MULTI-VITAMIN DAILY PO) Take  by mouth every day.     • venlafaxine (EFFEXOR) 75 MG TABS Take 75 mg by mouth 2 times a day.       No current facility-administered medications for this visit.         No Known Allergies      Family History   Problem Relation Age of Onset   • Cancer Mother         leiomyosarcoma   • Heart Disease Father    • Heart Failure Father    • Heart Attack Father    • Heart Attack Paternal Grandmother    • Other Maternal Grandmother         scoliosis   • Heart Disease Maternal Uncle    • Heart Attack Maternal Uncle          Social History     Socioeconomic History   • Marital status:      Spouse name: Not on file   • Number of children: Not on file   • Years of education: Not on file   • Highest education level: Not on file   Occupational History   • Not on file  "  Tobacco Use   • Smoking status: Never   • Smokeless tobacco: Never   Substance and Sexual Activity   • Alcohol use: Yes     Comment: 3 per week   • Drug use: No   • Sexual activity: Not on file   Other Topics Concern   • Not on file   Social History Narrative    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      Social Determinants of Health     Financial Resource Strain: Not on file   Food Insecurity: Not on file   Transportation Needs: Not on file   Physical Activity: Not on file   Stress: Not on file   Social Connections: Not on file   Intimate Partner Violence: Not on file   Housing Stability: Not on file         Physical Exam:  Ambulatory Vitals  /72 (BP Location: Left arm, Patient Position: Sitting, BP Cuff Size: Adult)   Pulse 70   Resp 12   Ht 1.702 m (5' 7\")   Wt 69.9 kg (154 lb)   SpO2 96%    Oxygen Therapy:  Pulse Oximetry: 96 %  BP Readings from Last 4 Encounters:   12/12/23 102/72   01/19/23 132/70   11/30/21 128/76   03/04/21 132/60       Weight/BMI: Body mass index is 24.12 kg/m².  Wt Readings from Last 4 Encounters:   12/12/23 69.9 kg (154 lb)   01/19/23 75.3 kg (166 lb)   11/30/21 75.3 kg (166 lb)   03/04/21 75.8 kg (167 lb)       General: No apparent distress  Eyes: nl conjunctiva  Neck: JVP <8 cm H2O  Lungs: normal respiratory effort, CTAB  Heart: RRR, no murmurs, no rubs or gallops, 1-2+ edema bilateral lower extremities. No LV/RV heave on cardiac palpatation. 2+ bilateral radial pulses.   Abdomen: soft, non tender, non distended, no masses, normal bowel sounds.  No HSM.  Extremities/MSK: no clubbing, no cyanosis  Neurological: No focal sensory deficits  Psychiatric: Appropriate affect, A/O x 3  Skin: Warm extremities    Exam repeated in full and unchanged except for as noted above.        Lab Data Review:  OSH labs 6/2017:  Creatinine 1.1, eGFR 48  Potassium 4.1  Sodium 144  tg 75    HDL 64      OSH labs " 1/26/2018:    Creatinine 1.27  eGFR 42  Sodium 140  Potassium 4.3  Bun 16  Mag 2    OSH labs 12/5/2018:  CBC WNL    HDL 60  tg 124    TSH 3.6  Creatinine 1.26  Bun 13  LFTs WNL  Sodium 143  Potassium 4.2  hgbA1c 5.5    OSH labs 2/20/2021:  Potassium 5.1.   Sodium 140  Bun 13  Creatinine 1.3  LFTs WNL  Trop negative  CBC WNL          Cardiac Imaging and Procedures Review:    EKG dated 1/23/2018:  SINUS RHYTHM  FIRST DEGREE AV BLOCK  LEFT AXIS DEVIATION  BORDERLINE R WAVE PROGRESSION, ANTERIOR LEADS    EKG dated 12/11/2018: my personal interpretation NSR, LVH, non-specific st changes.     EKG dated 2/12/2020:  Personal interpretation includes NSR, normal EKG.     Echocardiogram 1/30/2018      TTE 2/2021:  CONCLUSIONS  Aortic sclerosis without stenosis.  Otherwise normal transthoracic echocardiogram.      No prior study is available for comparison.     Echo [facility] 8/2021:  LVEF 65%.     Holter Monitor (10/2016):   Interpretive Statements   *  Monitoring started at 11:45 AM and continued for 24 hours. Cardiac rhythm   is Sinus with First Degree AV Block. Heart   rate in the range of 54 to 119 BPM. Average heart rate was 74 BPM. The   longest R-R interval was 1.5 seconds.   *  Ventricular ectopic activity consisted of one isolated interpolated PVCs.   *  The patient's rhythm included one hour 55 seconds of sinus bradycardia.   The slowest single episode occurred at   5:28:44 AM, lasting six minutes 20 seconds, with minimum heart rate of 54   BPM.   *  The patient's rhythm also included one hour 48 seconds of sinus   tachycardia. The fastest single episode occurred at   10:02:58 AM, lasting 44 seconds, with maximum heart rate of 119 BPM.   *  Supraventricular ectopic activity consisted of two atrial couplets, 22   single PACs.   *  Diary entries - all symptoms reported in diary were without the time of   occurrence. Unable to document symptoms with times.     IMPRESSION: Rare PACs and PVCs.     Stress  test 2013:  6 minutes 25 seconds  Max   Negative.       Radiology test review:  Chest CTA 2021:  Multiple pulm emboli with saddle embolus.     2021: left posterior tibial vein non-occclusive DVT.     Medical Decision Makin. SVT (supraventricular tachycardia) (CMS-[facility])  -Cont flecainde/coreg  -Last stress test negative in , no angina  -EKG Q6 months. Reviewed today.     2. Essential hypertension, benign  Well controlled    3. Stage 3 chronic kidney disease  Stable.      4. Chronic diastolic congestive heart failure (CMS-[facility])  NYHA class I.   -Cont lasix 20mg PO Daily prn and potassium prn. Advised she take these around once a week.     5. Mitral valve prolapse - mild MR.   Not seen on the last echo.  -removed from problem list, no need for f/u.     6. History of pulmonary embolism - saddle PE. Did not receive tPA. Doing quite well. No pre-disposing risk factors. Now with recurrence while on prophylactic eliquis.   -continue eliquis 5mg PO bid likely for life, seeing hematology tomorrow.   -will consider genetic work-up at future visits.   -echo from [facility] showed normal biventricular EF.     7. VERONA - on CPAP, getting titrated.     8. Primary Prevention  -Continue simvastastin, request OSH labs to see lipids, consider CCS to see if we need to intensify therapy.     Return in about 1 year (around 2024).      Baldemar Bueno MD  [facility]  [facility] [place] [facility], [address]  [address] [address]  Phone: [phone]  Fax: [phone]                Jacob Whipple M.D.  [address] [place] [address]  Via Fax: [phone]

## 2023-12-12 NOTE — PROGRESS NOTES
Cardiology Follow-up Consultation Note    Date of note:  12/12/2023  Primary Care Provider: Debbie Schofield M.D.  Referring Provider: Liborio Davis M.D.    Patient Name: Saba Stallworth   YOB: 1945  MRN:              [id number]    Chief Complaint: SOB    History of Present Illness: Saba Stallworth is a 78 y.o. female whose current medical problems include hypertension, hyperlipidemia, Heart failure with preserved ejection fraction, CKD stage III, and SVT s/p ablation who is here for follow-up.    At our visit, 1/23/2018:  She continues to have symptomatic palpitations, almost daily.  BP in the 150s-160s/60s-70s. HR ranged from .      In terms of her CHF, her weight is stable on lasix 20mg PO daily, no leg swelling/orthopnea.    In terms of her hypertension, her BP is elevated at home when she has palpitations, but otherwise normal.    In terms of her CKD, no change in urination, she has not had labs in over 6 months.     At our visit, 12/11/2018:  In terms of her SVT, she does have moderate severity palpitations which occur around once a week and lasts about 5 minutes and then resolves spontaneously. Not associated with activity. Does not usually miss medications.     Weight stable on lasix, no ROSE. Going to water aerobics class.     At our visit, 2/12/2020:  In terms of CHF, no longer taking lasix. Stable weight and no LE swelling.     At our visit, 3/4/2021:  She is here with her son Monty.     2 weeks ago she had hospitalization for severe saddle PE which occurred right after her COVID vaccine. This occurred around 5 minutes after her shot.  No longer hypoxemic, can walk around [place] without symptoms.     2 weeks before that she drove to [place] and did have acute SOB and pre-syncope when walking up the stairs once she arrived. This was just a 40 mile drive.     At our visit, 11/30/2021:  Did have an episode of syncope on 3/22/2021, presented to ER. Neg  troponin and CTA showed resolution of her PE. Monitored overnight, thought to be orthostatic. Was taking lasix which was then stopped.     In terms of SVT, no palpitations.     Hypertension well controlled.     Did have a fall due to leg spasm which caused a knee injury. Pending surgery next month.      Not exercising due to knee and back.     Occasional leg swelling, not taking lasix.     At our visit, 1/19/2023:  Bps in the 130s-140s at home.     Did have some body cramps, resolved with taking potassium.     Taking lasix rarely.     Rare palpitations, mild severity.     Walked around a mile yesterday, otherwise rarely exercising. No symptoms.     Weight up 10 pounds over last 6 months.     Interim Events:  Had back surgery complicated by DVT/PE. On eliquis now.     On CPAP now.     Some SOB when lying flat.     BP well controlled.     Patient denies chest pain, palpitations, dyspnea on exertion, pre-syncope, syncope, lower extremity swelling, orthopnea, PND or recent weight gain.       ROS   No fevers, CVA symptoms, bleeding issues.     Past Medical History:   Diagnosis Date    Arthritis     right thumb and wrist    Cold 2/7/15    sinus infection    Hyperlipidemia     Hypertension     Hypothyroidism     Palpitations     Saddle embolus of pulmonary artery without acute cor pulmonale (HCC) 8/5/2021    Staph infection 1/2015    right axilla Tx with antibiotics    SVT (supraventricular tachycardia) 2002/2015 2002:Ablation by Dr. Griffin. February 2015: EPS, unable to induce SVT or AT/AF. Maintained on medical therapy.         Past Surgical History:   Procedure Laterality Date    RECOVERY  2/19/2015    Performed by Cath-Recovery Surgery at SURGERY SAME DAY [facility] ORS    MASS EXCISION GENERAL  12/12/2014    Performed by Tara Hassan M.D. at SURGERY [facility]    OTHER CARDIAC SURGERY  2002    cardiac ablation    HYSTERECTOMY LAPAROSCOPY      Dr. Johnson    TONSILLECTOMY           Current Outpatient  Medications   Medication Sig Dispense Refill    HYDROcodone/acetaminophen (NORCO)  MG Tab TAKE 1 TABLET BY MOUTH EVERY 4 HOURS AS NEEDED FOR PAIN FOR 5 DAYS      ondansetron (ZOFRAN) 4 MG Tab tablet       simvastatin (ZOCOR) 20 MG Tab Take 1 Tablet by mouth every evening. 100 Tablet 3    flecainide (TAMBOCOR) 50 MG tablet Take 1 Tablet by mouth 2 times a day. 200 Tablet 3    carvedilol (COREG) 6.25 MG Tab Take 1 Tablet by mouth 2 times a day with meals. 200 Tablet 3    apixaban (ELIQUIS) 5mg Tab Take 5 mg by mouth 2 times a day.      Folic Acid-Vit B6-Vit B12 (FOLBEE PO) Take  by mouth.      Calcium Carb-Cholecalciferol (CALCIUM+D3 PO) Take  by mouth.      levothyroxine (SYNTHROID) 50 MCG Tab Take 50 mcg by mouth every morning on an empty stomach.      Multiple Vitamin (MULTI-VITAMIN DAILY PO) Take  by mouth every day.      venlafaxine (EFFEXOR) 75 MG TABS Take 75 mg by mouth 2 times a day.       No current facility-administered medications for this visit.         No Known Allergies      Family History   Problem Relation Age of Onset    Cancer Mother         leiomyosarcoma    Heart Disease Father     Heart Failure Father     Heart Attack Father     Heart Attack Paternal Grandmother     Other Maternal Grandmother         scoliosis    Heart Disease Maternal Uncle     Heart Attack Maternal Uncle          Social History     Socioeconomic History    Marital status:      Spouse name: Not on file    Number of children: Not on file    Years of education: Not on file    Highest education level: Not on file   Occupational History    Not on file   Tobacco Use    Smoking status: Never    Smokeless tobacco: Never   Substance and Sexual Activity    Alcohol use: Yes     Comment: 3 per week    Drug use: No    Sexual activity: Not on file   Other Topics Concern    Not on file   Social History Narrative    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      Social  "Determinants of Health     Financial Resource Strain: Not on file   Food Insecurity: Not on file   Transportation Needs: Not on file   Physical Activity: Not on file   Stress: Not on file   Social Connections: Not on file   Intimate Partner Violence: Not on file   Housing Stability: Not on file         Physical Exam:  Ambulatory Vitals  /72 (BP Location: Left arm, Patient Position: Sitting, BP Cuff Size: Adult)   Pulse 70   Resp 12   Ht 1.702 m (5' 7\")   Wt 69.9 kg (154 lb)   SpO2 96%    Oxygen Therapy:  Pulse Oximetry: 96 %  BP Readings from Last 4 Encounters:   12/12/23 102/72   01/19/23 132/70   11/30/21 128/76   03/04/21 132/60       Weight/BMI: Body mass index is 24.12 kg/m².  Wt Readings from Last 4 Encounters:   12/12/23 69.9 kg (154 lb)   01/19/23 75.3 kg (166 lb)   11/30/21 75.3 kg (166 lb)   03/04/21 75.8 kg (167 lb)       General: No apparent distress  Eyes: nl conjunctiva  Neck: JVP <8 cm H2O  Lungs: normal respiratory effort, CTAB  Heart: RRR, no murmurs, no rubs or gallops, 1-2+ edema bilateral lower extremities. No LV/RV heave on cardiac palpatation. 2+ bilateral radial pulses.   Abdomen: soft, non tender, non distended, no masses, normal bowel sounds.  No HSM.  Extremities/MSK: no clubbing, no cyanosis  Neurological: No focal sensory deficits  Psychiatric: Appropriate affect, A/O x 3  Skin: Warm extremities    Exam repeated in full and unchanged except for as noted above.        Lab Data Review:  OSH labs 6/2017:  Creatinine 1.1, eGFR 48  Potassium 4.1  Sodium 144  tg 75    HDL 64      OSH labs 1/26/2018:    Creatinine 1.27  eGFR 42  Sodium 140  Potassium 4.3  Bun 16  Mag 2    OSH labs 12/5/2018:  CBC WNL    HDL 60  tg 124    TSH 3.6  Creatinine 1.26  Bun 13  LFTs WNL  Sodium 143  Potassium 4.2  hgbA1c 5.5    OSH labs 2/20/2021:  Potassium 5.1.   Sodium 140  Bun 13  Creatinine 1.3  LFTs WNL  Trop negative  CBC WNL          Cardiac Imaging and Procedures " Review:    EKG dated 2018:  SINUS RHYTHM  FIRST DEGREE AV BLOCK  LEFT AXIS DEVIATION  BORDERLINE R WAVE PROGRESSION, ANTERIOR LEADS    EKG dated 2018: my personal interpretation NSR, LVH, non-specific st changes.     EKG dated 2020:  Personal interpretation includes NSR, normal EKG.     Echocardiogram 2018      TTE 2021:  CONCLUSIONS  Aortic sclerosis without stenosis.  Otherwise normal transthoracic echocardiogram.      No prior study is available for comparison.     Echo [facility] 2021:  LVEF 65%.     Holter Monitor (10/2016):   Interpretive Statements   *  Monitoring started at 11:45 AM and continued for 24 hours. Cardiac rhythm   is Sinus with First Degree AV Block. Heart   rate in the range of 54 to 119 BPM. Average heart rate was 74 BPM. The   longest R-R interval was 1.5 seconds.   *  Ventricular ectopic activity consisted of one isolated interpolated PVCs.   *  The patient's rhythm included one hour 55 seconds of sinus bradycardia.   The slowest single episode occurred at   5:28:44 AM, lasting six minutes 20 seconds, with minimum heart rate of 54   BPM.   *  The patient's rhythm also included one hour 48 seconds of sinus   tachycardia. The fastest single episode occurred at   10:02:58 AM, lasting 44 seconds, with maximum heart rate of 119 BPM.   *  Supraventricular ectopic activity consisted of two atrial couplets, 22   single PACs.   *  Diary entries - all symptoms reported in diary were without the time of   occurrence. Unable to document symptoms with times.     IMPRESSION: Rare PACs and PVCs.     Stress test :  6 minutes 25 seconds  Max   Negative.       Radiology test review:  Chest CTA 2021:  Multiple pulm emboli with saddle embolus.     2021: left posterior tibial vein non-occclusive DVT.     Medical Decision Makin. SVT (supraventricular tachycardia) (CMS-HCC)  -Cont flecainde/coreg  -Last stress test negative in , no angina  -EKG Q6 months. Reviewed  today.     2. Essential hypertension, benign  Well controlled    3. Stage 3 chronic kidney disease  Stable.      4. Chronic diastolic congestive heart failure (CMS-[facility])  NYHA class I.   -Cont lasix 20mg PO Daily prn and potassium prn. Advised she take these around once a week.     5. Mitral valve prolapse - mild MR.   Not seen on the last echo.  -removed from problem list, no need for f/u.     6. History of pulmonary embolism - saddle PE. Did not receive tPA. Doing quite well. No pre-disposing risk factors. Now with recurrence while on prophylactic eliquis.   -continue eliquis 5mg PO bid likely for life, seeing hematology tomorrow.   -will consider genetic work-up at future visits.   -echo from [facility] showed normal biventricular EF.     7. VERONA - on CPAP, getting titrated.     8. Primary Prevention  -Continue simvastastin, request OSH labs to see lipids, consider CCS to see if we need to intensify therapy.     Return in about 1 year (around 12/12/2024).      Baldemar Bueno MD  [facility]  [place] [facility], [address]  [address] [address]  Phone: [phone]  Fax: [phone]

## 2023-12-19 LAB — EKG IMPRESSION: NORMAL

## 2023-12-19 PROCEDURE — 93010 ELECTROCARDIOGRAM REPORT: CPT | Performed by: INTERNAL MEDICINE

## 2024-09-16 ENCOUNTER — TELEPHONE (OUTPATIENT)
Dept: CARDIOLOGY | Facility: MEDICAL CENTER | Age: 79
End: 2024-09-16
Payer: MEDICARE

## 2024-09-16 NOTE — LETTER
PROCEDURE/SURGERY CLEARANCE FORM      Encounter Date: 9/16/2024    Patient: Saba Stallworth  YOB: 1945    CARDIOLOGIST:  Baldemar Bueno M.D.    REFERRING DOCTOR:  No ref. provider found    Procedure/Surgery: Colonoscopy with deep (propofol) sedation    Dear Surgeon or Proceduralist,      Thank you for your request for cardiac stratification of our mutual patient Saba Stallworth 1945. We have reviewed their [facility] records; and to the best of our understanding this patient has not had stenting, ablation, cardiothoracic surgery or hospitalization for cardiovascular reasons in the past 6 months.  Saba Stallworth has been seen within the past 18 months and is considered to have non-modifiable cardiac risk for this low-risk procedure/surgery. They may proceed from a cardiovascular standpoint and may hold their antiplatelet/anticoagulation as briefly as possible. Please have patient resume this medication when hemodynamically stable to do so.     Aspirin or Prasugrel   - hold 7 days prior to procedure/surgery, resume when hemodynamically stable      Clopidrogrel or Ticagrelor  - hold 7 days for all neurological procedures, hold 5 days prior to all other procedure/surgery,  resume when hemodynamically stable     Warfarin - hold 7 days for all neurological procedures, hold 5 days prior to all other procedure/surgery and coordinate with [facility] ([phone]) INR testing and dose management.      Pradaxa/Xarelto/Eliquis/Savesya - hold 1 day prior to procedure for low bleeding risk procedure, 2 days for high bleeding risk procedure, or consider holding 3 days or longer for patients with reduced kidney function (CrCl <30mL/min) or spinal/cranial surgeries/procedures.      If they have a mechanical heart valve, please coordinate with [facility] ([phone]) the proper management of their anticoagulant in the periprocedural or perioperative period.       Some patients have higher risk for cardiovascular complications or holding medication. If our patient has had prior complications of holding antiplatelet or anticoagulants in the past and we have seen them after these events, we have addressed these concerns with the patient. They are at an unknown degree of increased risk for recurrent complication.  You may hold anticoagulation/antiplatelets for the procedure or surgery if the benefits of the procedure or surgery outweigh this nonmodifiable risk.      If Saba Stallworth 1945 has new symptoms of heart failure decompensation, unstable arrythmia, or angina please reach out and we will assess the patient.      If you have other patient-specific concerns, please feel free to reach out to the patient's cardiologist directly at [phone].     Thank you,       [facility]                                             Baldemar Bueno M.D.  Electronically Signed

## 2024-09-16 NOTE — TELEPHONE ENCOUNTER
Last OV: 12.12.2023  Proposed Surgery: Colonoscopy with deep (propofol) sedation  Surgery Date: 9.23.2024  Requesting Office Name: [facility]   Fax Number: [phone]  Preference of Location (default is surgery center unless specified by Cardiologist or [facility])  Prior Clearance Addressed: No      Anticoags/Antiplatelets: Apixaban   Anticoags/Antiplatelet managed by Cardiology? 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 Griffin. February 2015: EPS, unable to induce SVT or AT/AF. Maintained on medical therapy.             Surgical Clearance Letter Sent: YES   **Scan clearance request letter into [facility].**

## 2024-12-16 ENCOUNTER — OFFICE VISIT (OUTPATIENT)
Dept: CARDIOLOGY | Facility: MEDICAL CENTER | Age: 79
End: 2024-12-16
Attending: NURSE PRACTITIONER
Payer: MEDICARE

## 2024-12-16 VITALS
BODY MASS INDEX: 26.37 KG/M2 | DIASTOLIC BLOOD PRESSURE: 60 MMHG | WEIGHT: 168 LBS | RESPIRATION RATE: 15 BRPM | SYSTOLIC BLOOD PRESSURE: 126 MMHG | HEART RATE: 81 BPM | OXYGEN SATURATION: 96 % | HEIGHT: 67 IN

## 2024-12-16 DIAGNOSIS — E78.00 PURE HYPERCHOLESTEROLEMIA: ICD-10-CM

## 2024-12-16 DIAGNOSIS — I47.10 SVT (SUPRAVENTRICULAR TACHYCARDIA) (HCC): ICD-10-CM

## 2024-12-16 DIAGNOSIS — N18.30 STAGE 3 CHRONIC KIDNEY DISEASE, UNSPECIFIED WHETHER STAGE 3A OR 3B CKD: ICD-10-CM

## 2024-12-16 DIAGNOSIS — E03.9 HYPOTHYROIDISM, ACQUIRED: ICD-10-CM

## 2024-12-16 DIAGNOSIS — I82.5Z2 CHRONIC DEEP VEIN THROMBOSIS (DVT) OF DISTAL VEIN OF LEFT LOWER EXTREMITY (HCC): ICD-10-CM

## 2024-12-16 DIAGNOSIS — Z86.711 HISTORY OF PULMONARY EMBOLISM: ICD-10-CM

## 2024-12-16 DIAGNOSIS — R00.2 PALPITATIONS: ICD-10-CM

## 2024-12-16 DIAGNOSIS — I10 ESSENTIAL HYPERTENSION, BENIGN: ICD-10-CM

## 2024-12-16 DIAGNOSIS — D68.318 CIRCULATING ANTICOAGULANTS (HCC): ICD-10-CM

## 2024-12-16 DIAGNOSIS — I49.3 VENTRICULAR PREMATURE COMPLEXES: ICD-10-CM

## 2024-12-16 PROCEDURE — 3074F SYST BP LT 130 MM HG: CPT | Performed by: NURSE PRACTITIONER

## 2024-12-16 PROCEDURE — 3078F DIAST BP <80 MM HG: CPT | Performed by: NURSE PRACTITIONER

## 2024-12-16 PROCEDURE — 99212 OFFICE O/P EST SF 10 MIN: CPT | Performed by: NURSE PRACTITIONER

## 2024-12-16 PROCEDURE — 99213 OFFICE O/P EST LOW 20 MIN: CPT | Performed by: NURSE PRACTITIONER

## 2024-12-16 PROCEDURE — 99214 OFFICE O/P EST MOD 30 MIN: CPT | Performed by: NURSE PRACTITIONER

## 2024-12-16 PROCEDURE — 93005 ELECTROCARDIOGRAM TRACING: CPT | Mod: TC | Performed by: NURSE PRACTITIONER

## 2024-12-16 RX ORDER — FLECAINIDE ACETATE 50 MG/1
50 TABLET ORAL 2 TIMES DAILY
Qty: 200 TABLET | Refills: 3 | Status: SHIPPED | OUTPATIENT
Start: 2024-12-16

## 2024-12-16 RX ORDER — CARVEDILOL 6.25 MG/1
6.25 TABLET ORAL 2 TIMES DAILY WITH MEALS
Qty: 200 TABLET | Refills: 3 | Status: SHIPPED | OUTPATIENT
Start: 2024-12-16

## 2024-12-16 RX ORDER — POTASSIUM CHLORIDE 750 MG/1
10 TABLET, EXTENDED RELEASE ORAL
Qty: 30 TABLET | Refills: 3 | Status: SHIPPED | OUTPATIENT
Start: 2024-12-16

## 2024-12-16 RX ORDER — FUROSEMIDE 20 MG/1
20 TABLET ORAL
Qty: 30 TABLET | Refills: 3 | Status: SHIPPED | OUTPATIENT
Start: 2024-12-16

## 2024-12-16 RX ORDER — SIMVASTATIN 20 MG
20 TABLET ORAL EVERY EVENING
Qty: 100 TABLET | Refills: 3 | Status: SHIPPED | OUTPATIENT
Start: 2024-12-16

## 2024-12-16 ASSESSMENT — ENCOUNTER SYMPTOMS
INSOMNIA: 1
HEADACHES: 0
CHILLS: 0
NAUSEA: 0
COUGH: 0
LOSS OF CONSCIOUSNESS: 0
PALPITATIONS: 0
MYALGIAS: 0
ORTHOPNEA: 0
FEVER: 0
PND: 0
BACK PAIN: 1
ABDOMINAL PAIN: 0
SHORTNESS OF BREATH: 0
BRUISES/BLEEDS EASILY: 0
DIZZINESS: 0

## 2024-12-16 NOTE — PROGRESS NOTES
Chief Complaint   Patient presents with    Follow-Up    Supraventricular Tachycardia (SVT)    Pulmonary Embolism    Deep Vein Thrombosis    Anticoagulation    HTN (Controlled)    Hyperlipidemia       Subjective     Saba Stallworth is a 79 y.o. female who presents today for annual follow-up of SVT, history of PE/DVT, anticoagulation, HTN, hyperlipidemia, and hypothyroidism.    Saba is a 79 year old female with history of SVT, first ablated in 2002, with repeat attempt in February 2015 (unable to induce SVT/AT/AF), maintained on Flecainide and Toprol, history of PE/DVT in March 2021 (followed by Dr. Leon [pulmonology] in ), anticoagulation with Eliquis, hypertension and hyperlipidemia, normally followed by Dr. Bueno, and last seen in December 2023.    Since then, she did have a pain stimulator implanted in her back for ongoing/chronic back pain by Dr. Matt; the results have been suboptimal.    She is here today for annual follow-up. She admits she has not been taking her Coreg and Tambocor regularly in the last 3-4 months. No SVT episodes. Occasional, rare, nonsustained skipped beats. No chest pain, pressure, tightness or discomfort; no shortness of breath, orthopnea or PND; no dizziness or syncope; no edema. BP is well controlled.    Past Medical History:   Diagnosis Date    Arthritis     right thumb and wrist    Back pain     Chronic anticoagulation     History of pulmonary embolism 03/2021    Hyperlipidemia     Hypertension     Hypothyroidism     Palpitations     Staph infection 01/2015    Right axilla Tx with antibiotics    SVT (supraventricular tachycardia) (HCC)     2002: Ablation by Dr. Griffin. February 2015: EPS, unable to induce SVT or AT/AF. Maintained on medical therapy.     Past Surgical History:   Procedure Laterality Date    RECOVERY  2/19/2015    Performed by Cath-Recovery Surgery at SURGERY [facility] DAY [facility]    MASS EXCISION GENERAL  12/12/2014    Performed by Tara Hassan M.D.  at SURGERY [facility]    OTHER CARDIAC SURGERY  2002    cardiac ablation    HYSTERECTOMY LAPAROSCOPY      Dr. Johnson    TONSILLECTOMY       Family History   Problem Relation Age of Onset    Cancer Mother         leiomyosarcoma    Heart Disease Father     Heart Failure Father     Heart Attack Father     Heart Attack Paternal Grandmother     Other Maternal Grandmother         scoliosis    Heart Disease Maternal Uncle     Heart Attack Maternal Uncle      Social History     Socioeconomic History    Marital status:      Spouse name: Not on file    Number of children: Not on file    Years of education: Not on file    Highest education level: Not on file   Occupational History    Not on file   Tobacco Use    Smoking status: Never    Smokeless tobacco: Never   Substance and Sexual Activity    Alcohol use: Yes     Comment: 3 per week    Drug use: No    Sexual activity: Not on file   Other Topics Concern    Not on file   Social History Narrative    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      Social Drivers of Health     Financial Resource Strain: Low Risk  (8/5/2021)    [facility]    Overall Financial Resource Strain (CARDIA)     Difficulty of Paying Living Expenses: Not hard at all   Food Insecurity: No Food Insecurity (8/5/2021)    [facility]    Hunger Vital Sign     Worried About Running Out of Food in the Last Year: Never true     Ran Out of Food in the Last Year: Never true   Transportation Needs: No Transportation Needs (8/5/2021)    [facility] from [facility]    PRAPARE - Transportation     Lack of Transportation (Medical): No     Lack of Transportation (Non-Medical): No   Physical Activity: Unknown (8/5/2021)    [facility] from [facility]    Exercise  Vital Sign     Days of Exercise per Week: Patient declined     Minutes of Exercise per Session: Patient declined   Stress: Unknown (8/5/2021)    [facility]    [facility] - Occupational Stress Questionnaire     Feeling of Stress : Patient declined   Social Connections: Unknown (8/5/2021)    [facility]    Social Connection and Isolation Panel [NHANES]     Frequency of Communication with Friends and Family: Patient declined     Frequency of Social Gatherings with Friends and Family: Patient declined     Attends Nondenominational Services: Patient declined     Active Member of Clubs or Organizations: Patient declined     Attends Club or Organization Meetings: Patient declined     Marital Status: Patient declined   Intimate Partner Violence: Unknown (8/5/2021)    Received from [facility]    Humiliation, Afraid, Rape, and Kick questionnaire     Fear of Current or Ex-Partner: Patient declined     Emotionally Abused: Patient declined     Physically Abused: Patient declined     Sexually Abused: Patient declined   Housing Stability: Unknown (8/5/2021)    [facility]    Housing Stability Vital Sign     Unable to Pay for Housing in the Last Year: Patient refused     Number of Places Lived in the Last Year: Not on file     Unstable Housing in the Last Year: Patient refused     No Known Allergies  Outpatient Encounter Medications as of 12/16/2024   Medication Sig Dispense Refill    simvastatin (ZOCOR) 20 MG Tab Take 1 Tablet by mouth every evening. 100 Tablet 3    furosemide (LASIX) 20 MG Tab Take 1 Tablet by mouth 1 time a day as needed (weight gain of >2 pounds in one day or significant increase in leg swelling.). 30 Tablet 3    potassium chloride ER (KLOR-CON) 10 MEQ tablet Take 1 Tablet by mouth 1 time a day as needed (weight  gain of >2 pounds in one day or significant increase in leg swelling.). 30 Tablet 3    carvedilol (COREG) 6.25 MG Tab Take 1 Tablet by mouth 2 times a day with meals. 200 Tablet 3    flecainide (TAMBOCOR) 50 MG tablet Take 1 Tablet by mouth 2 times a day. 200 Tablet 3    apixaban (ELIQUIS) 5mg Tab Take 5 mg by mouth 2 times a day.      Folic Acid-Vit B6-Vit B12 (FOLBEE PO) Take  by mouth.      Calcium Carb-Cholecalciferol (CALCIUM+D3 PO) Take  by mouth.      levothyroxine (SYNTHROID) 50 MCG Tab Take 50 mcg by mouth every morning on an empty stomach.      Multiple Vitamin (MULTI-VITAMIN DAILY PO) Take  by mouth every day.      [DISCONTINUED] HYDROcodone/acetaminophen (NORCO)  MG Tab TAKE 1 TABLET BY MOUTH EVERY 4 HOURS AS NEEDED FOR PAIN FOR 5 DAYS      [DISCONTINUED] ondansetron (ZOFRAN) 4 MG Tab tablet       [DISCONTINUED] flecainide (TAMBOCOR) 50 MG tablet Take 1 Tablet by mouth 2 times a day. 200 Tablet 3    [DISCONTINUED] carvedilol (COREG) 6.25 MG Tab Take 1 Tablet by mouth 2 times a day with meals. 200 Tablet 3    [DISCONTINUED] simvastatin (ZOCOR) 20 MG Tab Take 1 Tablet by mouth every evening. 100 Tablet 3    [DISCONTINUED] furosemide (LASIX) 20 MG Tab Take 1 Tablet by mouth 1 time a day as needed (weight gain of >2 pounds in one day or significant increase in leg swelling.). 30 Tablet 3    [DISCONTINUED] potassium chloride ER (KLOR-CON) 10 MEQ tablet Take 1 Tablet by mouth 1 time a day as needed (weight gain of >2 pounds in one day or significant increase in leg swelling.). 30 Tablet 3    [DISCONTINUED] venlafaxine (EFFEXOR) 75 MG TABS Take 75 mg by mouth 2 times a day. (Patient not taking: Reported on 12/16/2024)       No facility-administered encounter medications on file as of 12/16/2024.     Review of Systems   Constitutional:  Negative for chills and fever.   HENT:  Negative for congestion.    Respiratory:  Negative for cough and shortness of breath.    Cardiovascular:  Negative for chest pain,  "palpitations, orthopnea, leg swelling and PND.   Gastrointestinal:  Negative for abdominal pain and nausea.   Musculoskeletal:  Positive for back pain. Negative for myalgias.   Skin:  Negative for rash.   Neurological:  Negative for dizziness, loss of consciousness and headaches.   Endo/Heme/Allergies:  Does not bruise/bleed easily.   Psychiatric/Behavioral:  The patient has insomnia.               Objective     /60 (BP Location: Left arm, Patient Position: Sitting, BP Cuff Size: Adult)   Pulse 81   Resp 15   Ht 1.702 m (5' 7\")   Wt 76.2 kg (168 lb)   SpO2 96%   BMI 26.31 kg/m²     Physical Exam  Constitutional:       Appearance: She is well-developed.   HENT:      Head: Normocephalic.   Neck:      Vascular: No JVD.   Cardiovascular:      Rate and Rhythm: Normal rate and regular rhythm.      Heart sounds: Normal heart sounds.   Pulmonary:      Effort: Pulmonary effort is normal. No respiratory distress.      Breath sounds: Normal breath sounds. No wheezing or rales.   Abdominal:      General: Bowel sounds are normal. There is no distension.      Palpations: Abdomen is soft.      Tenderness: There is no abdominal tenderness.   Musculoskeletal:         General: Normal range of motion.      Cervical back: Normal range of motion and neck supple.   Skin:     General: Skin is warm and dry.      Findings: No rash.   Neurological:      Mental Status: She is alert and oriented to person, place, and time.   Psychiatric:         Mood and Affect: Mood normal.         Behavior: Behavior normal.     EKG ordered and interpreted by me today reveals sinus rhythm at 78bpm with mild LVH.    CONCLUSIONS OF TTE OF 2/15/2021:  Aortic sclerosis without stenosis.  Otherwise normal transthoracic echocardiogram.   No prior study is available for comparison.         RESULTS OF ECHOCARDIOGRAM OF 1/30/2018:  Normal LV size  LVEF 60%  Normal RA, LA and RV.  Mild MR  Trace AI  Trace TR  Trace PI  Normal aortic root    LABS AS OF " 10/19/2023 ([facility]):  WBC 9.5  RBC 4.37  Hgb 13.2  Hct 39.3  Platelets 158  Potassium 3.9  AST 48  ALT 32  Glucose 114  Creatinine 0.93  GFR 64  Cholesterol 161  Triglycerides 102  HDL 51  LDL 96    Assessment & Plan     1. SVT (supraventricular tachycardia) (HCC)  EKG    furosemide (LASIX) 20 MG Tab    potassium chloride ER (KLOR-CON) 10 MEQ tablet    flecainide (TAMBOCOR) 50 MG tablet      2. Ventricular premature complexes  furosemide (LASIX) 20 MG Tab    potassium chloride ER (KLOR-CON) 10 MEQ tablet      3. Palpitations  furosemide (LASIX) 20 MG Tab    potassium chloride ER (KLOR-CON) 10 MEQ tablet      4. History of pulmonary embolism        5. Chronic deep vein thrombosis (DVT) of distal vein of left lower extremity (HCC)  furosemide (LASIX) 20 MG Tab    potassium chloride ER (KLOR-CON) 10 MEQ tablet      6. Circulating anticoagulants (HCC)  CBC WITHOUT DIFFERENTIAL      7. Essential hypertension, benign  furosemide (LASIX) 20 MG Tab    potassium chloride ER (KLOR-CON) 10 MEQ tablet    carvedilol (COREG) 6.25 MG Tab      8. Pure hypercholesterolemia  Comp Metabolic Panel    Lipid Profile    simvastatin (ZOCOR) 20 MG Tab    furosemide (LASIX) 20 MG Tab    potassium chloride ER (KLOR-CON) 10 MEQ tablet      9. Stage 3 chronic kidney disease, unspecified whether stage 3a or 3b CKD  furosemide (LASIX) 20 MG Tab    potassium chloride ER (KLOR-CON) 10 MEQ tablet      10. Hypothyroidism, acquired  TSH          Medical Decision Making: Today's Assessment/Status/Plan:        SVT, status post ablation in 2002, currently OFF of Coreg and Flecainide. She will try staying off of these but if she has recurrence of symptoms (palpitations, fast HR), to notify our office. May need to resume them.  History of saddle PE/DVT, anticoagulated with Eliquis. Followed by Dr. Leon (pulmonology) in [place].  Hypertension, currently off of Coreg. To monitor BP at home.  Hyperlipidemia, treated with Zocor 20mg. To  repeat fasting CMP and lipid panel.  CKD, stage 3a, stable. To repeat GFR.  Hypothyroidism, treated. To check TSH.    Same medications at this time; can stay off of Coreg and Flecainide for now. Monitor for any recurrence of SVT (or tachycardia).  Labs as above.  Follow-up annually, sooner if clinical condition changes.

## 2024-12-17 LAB — EKG IMPRESSION: NORMAL

## 2024-12-17 PROCEDURE — 93010 ELECTROCARDIOGRAM REPORT: CPT | Performed by: INTERNAL MEDICINE
